# Patient Record
Sex: MALE | Race: WHITE
[De-identification: names, ages, dates, MRNs, and addresses within clinical notes are randomized per-mention and may not be internally consistent; named-entity substitution may affect disease eponyms.]

---

## 2018-10-12 NOTE — HP
CIWA Score





- CIWA Score


Nausea/Vomitin-No Nausea/No Vomiting


Muscle Tremors: 4-Moderate,w/Arms Extend


Anxiety: 4-Mod. Anxious/Guarded


Agitation: 4-Moderately Restless


Paroxysmal Sweats: 3 (Facial moisture w/o beading)


Orientation: 1-Uncertain about Date


Tacttile Disturbances: 0-None


Auditory Disturbances: 0-None


Visual Disturbances: 0-None


Headache: 0-None Present


CIWA-Ar Total Score: 16





Admission ROS [facility]





- HPI


Chief Complaint: 





Having withdrawal symptoms from alcohol.


Allergies/Adverse Reactions: 


 Allergies











Allergy/AdvReac Type Severity Reaction Status Date / Time


 


Fish Containing Products Allergy Severe  Verified 10/12/18 17:42


 


No Known Drug Allergies Allergy   Verified 10/12/18 17:47











History of Present Illness: 





I'm here for detox for my alcohol and cocaine use. 





Alcohol use since age 14.


Cocaine use since age 19.


Nicotine use since age 14.





Denies hx blackouts. States hx seizure 20 years ago r/t intense crack use.


Longest length of sobriety 7 years ended in . 





Hx: DM, HTN, hypercholesterol, Asthma. Chronic leg and back pain. States on 

oxycodone but doesn't take unless has really bad pain. 


Patient informed that this facility does not prescribe opiates for pain 

management and states ibuprofen will be okay.





State non-compliant w/ DM medications x 2 days. Current BG is 229. Will give 

Janumet and glipizide but hold insulin and evaluate need based on BGM during 

admission.  





Hx PPD (+). Patient brought in results of negative C-Xray from May, 2018. 





Search Terms: Lance Oconnor, 1975 


Search Date: 10/12/2018 04:46:37 PM 


The Drug Utilization Report below displays all of the controlled substance 

prescriptions, if any, that your patient has filled in the last twelve months. 

The information displayed on this report is compiled from pharmacy submissions 

to the Department, and accurately reflects the information as submitted by the 

pharmacies.


This report was requested by: Mira Baez | Reference #: [id number] 








Patient Name: Lance Oconnor YOB: 1975


 


Address: [address] [place] [address] Sex: Male














 Rx Written Rx Dispensed Drug Quantity Days Supply Prescriber Name  


 


10/09/2018 10/11/2018 oxycodone-acetaminophen  mg tab  30 15 Bonte, 

Jameson Reddy (MD)  


 


2018 oxycodone-acetaminophen  mg tab  30 15 Calvo, Sushma

  


 


2018 oxycodone-acetaminophen  mg tab  30 15 Jameson Rodriguez (MD)  


 


2018 oxycodone-acetaminophen  mg tab  30 15 Jameson Rodriguez (MD)  


 


2018 oxycodone-acetaminophen  mg tab  30 15 Calvo, Sushma

  


 


07/10/2018 07/10/2018 oxycodone-acetaminophen  mg tab  30 15 Calvo, Sushma

  


 


2018 oxycodone-acetaminophen  mg tab  30 15 Calvo, Sushma

  


 


2018 oxycodone-acetaminophen  mg tab  30 15 Jameson Rodriguez (MD)  


 


05/15/2018 05/15/2018 oxycodone-acetaminophen  mg tab  30 15 Jameson Rodriguez (MD)  














Patient Name: Lance Oconnor YOB: 1975


 


Address: [address] [place] [address] Sex: Male














 Rx Written Rx Dispensed Drug Quantity Days Supply Prescriber Name  


 


2018 oxycodone-acetaminophen  mg tab  21 7 Kitty Cook  


 


2018 oxycodone-acetaminophen  mg tab  21 7 Kitty Cook  


 


2018 oxycodone-acetaminophen  mg tab  21 7 Kitty Cook  


 


2018 oxycodone-acetaminophen  mg tab  21 7 Kitty Cook  


 


2018 acetaminophen-cod #3 tablet  30 15 Kitty Cook  


 


2017 oxycodone-acetaminophen  mg tab  60 30 Kasi Carranza MD  


 


11/15/2017 11/15/2017 oxycodone-acetaminophen  mg tab  60 30 Kasi Carranza MD  


 


10/18/2017 10/18/2017 oxycodone-acetaminophen  mg tab  60 30 Kasi Carranza MD  























x











Exam Limitations: No Limitations





- Ebola screening


Have you traveled outside of the country in the last 21 days: No


Have you had contact with anyone from an Ebola affected area: No


Have you been sick,other than usual withdrawal symptoms: No


Do you have a fever: No





- Review of Systems


Constitutional: Chills, Diaphoresis, Changes in sleep (Difficulty staying 

asleep.)


EENT: reports: Blurred Vision (Wears glasses), Dental Problems (Gingivitis. 

Chew and swallows ok.)


Respiratory: reports: Cough (Cough x 1 day. No phlegm), SOB with Exertion, 

Other (States has sleep apnea but not using a CPAP machine.)


Cardiac: reports: No Symptoms Reported


GI: reports: No Symptoms Reported


: reports: No Symptoms Reported


Musculoskeletal: reports: Back Pain (Chronic current LBP x 10 years. Pain is a '

9' and achy. Increases w/ walking. Decreases w/ rest.), Other (Pain in entire (L

) leg x 10 years. Pain '9' is achy. Worse w/ walking. Better w/ resting/sitting.

)


Integumentary: reports: No Symptoms Reported


Neuro: reports: Unsteady Gait (r/t pain in (L) leg)


Endocrine: reports: No Symptoms Reported


Hematology: reports: No Symptoms Reported


Psychiatric: reports: Judgement Intact, Agitated, Anxious, Depressed (Denies 

thoughts of harming self or others), other (Knows month and year. unsure of date

)


Other Systems: Reviewed and Negative





Patient History





- Patient Medical History


Hx Anemia: No


Hx Asthma: Yes (Moderate asthma. Occ flovent )


Hx Chronic Obstructive Pulmonary Disease (COPD): No


Hx Cancer: No


Hx Cardiac Disorders: No


Hx Congestive Heart Failure: No


Hx Hypertension: No


Hx Hypercholesterolemia: Yes (On meds )


Hx Pacemaker: No


HX Cerebrovascular Accident: No


Hx Seizures: Yes (20 years ago)


Hx Dementia: No


Hx Diabetes: Yes (States only takes Janumet, )


Hx Gastrointestinal Disorders: No


Hx Liver Disease: No


Hx Genitourinary Disorders: No


Hx Sexually Transmitted Disorders: No


Hx Renal Disease (ESRD): No


Hx Thyroid Disease: No


Hx Human Immunodeficiency Virus (HIV): No (Negative - Last tested )


Hx Hepatitis C: No


Hx Depression: Yes (Denies current thoughts of harming self)


Hx Suicide Attempt: Yes (20 years ago)


Hx Bipolar Disorder: Yes (bi-polar)


Hx Schizophrenia: No





- Patient Surgical History


Past Surgical History: Yes


Hx Orthopedic Surgery: Yes (ORIF )


Anesthesia Reaction: No





- PPD History


Previous Implant?: Yes


Implanted On Prior R Admission?: No


PPD to be Administered?: No





- Smoking Cessation


Smoking history: Current every day smoker


Have you smoked in the past 12 months: Yes


Aproximately how many cigarettes per day: 4


Hx Chewing Tobacco Use: No


Initiated information on smoking cessation: Yes


'Breaking Loose' booklet given: 10/12/18





- Substance & Tx. History


Hx Alcohol Use: Yes


Hx Substance Use: Yes


Substance Use Type: Alcohol, Cocaine


Hx Substance Use Treatment: Yes (detox)





- Substances Abused


  ** Alcohol


Route: Oral


Frequency: Daily


Amount used: 2 pints vodka, 12- 24 oz beers


Age of first use: 14


Date of Last Use: 10/12/18 (2 am)





  ** Cocaine


Route: Smoking


Frequency: Daily


Amount used: 2 gm


Age of first use: 19


Date of Last Use: 10/12/18 (2 am)





Admission Physical Exam [facility]





- Vital Signs


Vital Signs: 


 Vital Signs - 24 hr











  10/12/18





  14:14


 


Temperature 97.0 F L


 


Pulse Rate 81


 


Respiratory 20





Rate 


 


Blood Pressure 130/75














- Physical


General Appearance: Yes: Mild Distress, Tremorous, Irritable, Sweating, Anxious


HEENTM: Yes: EOMI, Hearing grossly Normal, Normal Voice, ALFREDO, Other (Enkarged 

tonsils w/o lesions or erythema. Stage 3.)


Respiratory: Yes: No Respiratory Distress, Wheezing (Inspiratory and expiratory 

wheeze. No rales or rhonchi)


Neck: Yes: No masses,lesions,Nodules, Supple


Breast: Yes: Breast Exam Deferred


Cardiology: Yes: Regular Rhythm, Regular Rate, S1, S2


Abdominal: Yes: Non Tender, Soft, Increased Bowel Sounds, Protuberent (

Increased abdomnial adiposity)


Genitourinary: Yes: Within Normal Limits


Back: Yes: Normal Inspection


Musculoskeletal: Yes: full range of Motion


Extremities: Yes: Normal Capillary Refill, Normal Range of Motion, Tremors (At 

rest and increases with arm extension), Other (Generalized LE adiposity. 

Increased darkened varicosities BLE. No edema.)


Neurological: Yes: CNs II-XII NML intact, Alert, Motor Strength 5/5, Normal 

Response


Integumentary: Yes: Normal Color, Warm, Other (Increased darkened varicosities 

BLE. No edema. Pedal pulses (+))


Lymphatic: Yes: Within Normal Limits





- Diagnostic


(1) Alcohol dependence with uncomplicated withdrawal


Current Visit: Yes   Status: Acute   





(2) Crack cocaine use


Current Visit: Yes   Status: Chronic   





(3) Diabetes


Current Visit: Yes   Status: Chronic   


Qualifiers: 


   Diabetes mellitus type: type 2   Diabetes mellitus long term insulin use: 

unspecified long term insulin use status   Diabetes mellitus complication status

: with unspecified complications   Qualified Code(s): E11.8 - Type 2 diabetes 

mellitus with unspecified complications   





(4) Hypertension


Current Visit: Yes   Status: Chronic   


Qualifiers: 


   Hypertension type: essential hypertension   Qualified Code(s): I10 - 

Essential (primary) hypertension   





(5) Arthralgia


Current Visit: Yes   Status: Chronic   


Qualifiers: 


   Joint pain location: unspecified   Qualified Code(s): M25.50 - Pain in 

unspecified joint   





(6) Morbid obesity


Current Visit: Yes   Status: Chronic   





(7) Varicose veins of both lower extremities


Current Visit: Yes   Status: Chronic   


Qualifiers: 


   Varicose vein complication: asymptomatic   Qualified Code(s): I83.93 - 

Asymptomatic varicose veins of bilateral lower extremities   





(8) Asthma


Current Visit: Yes   Status: Acute   


Qualifiers: 


   Asthma severity: unspecified severity   Asthma persistence: intermittent   

Asthma complication type: with acute exacerbation   Qualified Code(s): J45.21 - 

Mild intermittent asthma with (acute) exacerbation   





Cleared for Admission [facility]





- Detox or Rehab


[facility] Level of Care: Medically Managed


Detox Regimen/Protocol: Librium





[facility] Breath Alcohol Content


Breath Alcohol Content: 0





Urine Drug Screen





- Results


Drug Screen Negative: No


Urine Drug Screen Results: MEHUL-Cocaine, BZO-Benzodiazepines

## 2018-10-13 NOTE — CONSULT
[facility] Psychiatric Consult





- Data


Date of interview: 10/13/18


Admission source: [facility]


Identifying data: First admission to [facility] for this 41 y/o  male 

seeking detoxification treatment [address] for alcohol and cocaine dependence. 

Patient is single without dependents,homeless,unemployed and deprived of income.


Substance Abuse History: Discussed with patient in this interview. Mr Oconnor 

confirms the following data included in this segment of [facility] report : Smoking 

history: Current every day smoker.  Have you smoked in the past 12 months: Yes.

  Aproximately how many cigarettes per day: 4.  Hx Chewing Tobacco Use: No.  

Initiated information on smoking cessation: Yes.  'Breaking Loose' booklet given

: 10/12/18.  - Substance & Tx. History.  Hx Alcohol Use: Yes.  Hx Substance Use

: Yes.  Substance Use Type: Alcohol, Cocaine.  Hx Substance Use Treatment: Yes (

detox).  - Substances Abused.  ** Alcohol.  Route: Oral.  Frequency: Daily.  

Amount used: 2 pints vodka, 12- 24 oz beers.  Age of first use: 14.  Date of 

Last Use: 10/12/18 (2 am).  ** Cocaine.  Route: Smoking.  Frequency: Daily.  

Amount used: 2 gm.  Age of first use: 19.  Date of Last Use: 10/12/18 (2 am)


Medical History: Diabetes mellitus,dyslipidemia,obesity,bronchial asthma,

hypertension,history of positive PPD and chronic lumbar pain.


Psychiatric History: Patient endorses a history of five psychiatric 

hospitalizations ([facility]). Diagnosed with 

MDD and Bipolar Disorder (self-report).Mr Oconnor states that he used to be 

prescribed celexa,haldol,trazodone (doses and date of last intake : not recalled

).No contact with psychiatric OPD care providers. " I last took those 

medications when I was at [employer]. " More than six months ago. Patient 

admits to a remote history of a suicide attempt via overdose with medications (

years ago).


Physical/Sexual Abuse/Trauma History: Patient denies.


Additional Comment: Urine Drug Screen Results: MEHUL-Cocaine, BZO-

Benzodiazepines.Noted.





Mental Status Exam





- Mental Status Exam


Alert and Oriented to: Time, Place, Person


Cognitive Function: Grossly Intact


Patient Appearance: Unkempt, Disheveled


Mood: Nervous, Withdrawn


Affect: Mood Congruent


Patient Behavior: Fatigued, Cooperative


Speech Pattern: Clear


Voice Loudness: Normal


Thought Process: Goal Oriented


Thought Disorder: Not Present


Hallucinations: Denies


Suicidal Ideation: Denies


Homicidal Ideation: Denies


Insight/Judgement: Poor


Sleep: Poorly, Difficulty falling asleep


Appetite: Good


Muscle strength/Tone: Normal


Gait/Station: Normal





Psychiatric Findings





- Problem List (Axis 1, 2,3)


(1) Alcohol dependence with uncomplicated withdrawal


Current Visit: Yes   Status: Acute   





(2) Cocaine dependence


Current Visit: Yes   Status: Acute   





(3) Nicotine dependence


Current Visit: Yes   Status: Acute   





(4) Substance induced mood disorder


Current Visit: Yes   Status: Acute   





(5) Insomnia


Current Visit: Yes   Status: Acute   





(6) Non-compliant patient


Current Visit: Yes   Status: Chronic   





- Initial Treatment Plan


Initial Treatment Plan: Psychoeducation.Sleep hygiene.Detoxification in 

progress. No indication, at this time, for the re-introduction of psychotropic 

drugs other than medications needed for detoxification purposes.Insomnia is 

addressed with melatonin at bedside.Medication reconciliation : no 

psychotropics found ; only medical formulations. Observation.

## 2018-10-13 NOTE — EKG
Test Reason : 

Blood Pressure : ***/*** mmHG

Vent. Rate : 078 BPM     Atrial Rate : 078 BPM

   P-R Int : 166 ms          QRS Dur : 080 ms

    QT Int : 486 ms       P-R-T Axes : 060 037 056 degrees

   QTc Int : 554 ms

 

*** POOR DATA QUALITY, INTERPRETATION MAY BE ADVERSELY AFFECTED

NORMAL SINUS RHYTHM

PROLONGED QT

ABNORMAL ECG

NO PREVIOUS ECGS AVAILABLE

Confirmed by JEFFREY KNOWLES MD ([id number]) on 10/13/2018 10:34:13 AM

 

Referred By:             Confirmed By:JEFFREY KNOWLES MD

## 2018-10-13 NOTE — PN
S CIWA





- CIWA Score


Nausea/Vomitin


Muscle Tremors: 2


Anxiety: 2


Agitation: 2


Paroxysmal Sweats: 1-Minimal Palms Moist


Orientation: 0-Oriented


Tacttile Disturbances: 1-Very Mild Itch/Numbness


Auditory Disturbances: 1-Very Mild


Visual Disturbances: 1-Very Mild Sensitivity


Headache: 2-Mild


CIWA-Ar Total Score: 14





BHS Progress Note (SOAP)


Subjective: 





alert,irritable,anxious,interrupted sleep,tremor


Objective: 





10/13/18 16:13


 Vital Signs











Temperature  97.0 F L  10/13/18 15:23


 


Pulse Rate  74   10/13/18 15:23


 


Respiratory Rate  18   10/13/18 15:23


 


Blood Pressure  128/87   10/13/18 15:23


 


O2 Sat by Pulse Oximetry (%)      








ekg poor quality nsr


qt/qtc 486/554


no chest pain,no sob,no dizziness


 Laboratory Last Values











WBC  7.3 K/mm3 (4.0-10.0)   10/13/18  08:00    


 


RBC  4.68 M/mm3 (4.00-5.60)   10/13/18  08:00    


 


Hgb  13.8 GM/dL (11.7-16.9)   10/13/18  08:00    


 


Hct  41.8 % (35.4-49)   10/13/18  08:00    


 


MCV  89.2 fl (80-96)   10/13/18  08:00    


 


MCH  29.5 pg (25.7-33.7)   10/13/18  08:00    


 


MCHC  33.1 g/dl (32.0-35.9)   10/13/18  08:00    


 


RDW  14.6 % (11.9-15.9)   10/13/18  08:00    


 


Plt Count  190 K/MM3 (134-434)   10/13/18  08:00    


 


MPV  8.6 fl (7.5-11.1)   10/13/18  08:00    


 


Sodium  138 mmol/L (136-145)   10/13/18  08:00    


 


Potassium  4.0 mmol/L (3.5-5.1)   10/13/18  08:00    


 


Chloride  100 mmol/L ()   10/13/18  08:00    


 


Carbon Dioxide  30 mmol/L (21-32)   10/13/18  08:00    


 


Anion Gap  8 MMOL/L (8-16)   10/13/18  08:00    


 


BUN  15 mg/dL (7-18)   10/13/18  08:00    


 


Creatinine  0.6 mg/dL (0.55-1.3)   10/13/18  08:00    


 


Creat Clearance w eGFR  > 60  (>60)   10/13/18  08:00    


 


POC Glucometer  169 UNITS ()   10/13/18  07:14    


 


Random Glucose  157 mg/dL ()  H  10/13/18  08:00    


 


Calcium  8.9 mg/dL (8.5-10.1)   10/13/18  08:00    


 


Total Bilirubin  0.6 mg/dL (0.2-1)   10/13/18  08:00    


 


AST  13 U/L (15-37)  L  10/13/18  08:00    


 


ALT  13 U/L (13-61)   10/13/18  08:00    


 


Alkaline Phosphatase  78 U/L ()   10/13/18  08:00    


 


Total Protein  6.9 g/dl (6.4-8.2)   10/13/18  08:00    


 


Albumin  3.2 g/dl (3.4-5.0)  L  10/13/18  08:00    


 


Urine Color  Yellow   10/12/18  23:59    


 


Urine Appearance  Clear   10/12/18  23:59    


 


Urine pH  5.0  (5.0-8.0)   10/12/18  23:59    


 


Ur Specific Gravity  1.026  (1.010-1.035)   10/12/18  23:59    


 


Urine Protein  1+  (NEGATIVE)  H  10/12/18  23:59    


 


Urine Glucose (UA)  3+  (NEGATIVE)  H  10/12/18  23:59    


 


Urine Ketones  Trace  (NEGATIVE)  H  10/12/18  23:59    


 


Urine Blood  Negative  (NEGATIVE)   10/12/18  23:59    


 


Urine Nitrite  Negative  (NEGATIVE)   10/12/18  23:59    


 


Urine Bilirubin  Negative  (<2.0 mg/dL)   10/12/18  23:59    


 


Urine Urobilinogen  Negative mg/dL (0.2-1.0)   10/12/18  23:59    


 


Ur Leukocyte Esterase  Negative  (NEGATIVE)   10/12/18  23:59    


 


Urine WBC (Auto)  <1 /hpf (3-5)   10/12/18  23:59    


 


Urine RBC (Auto)  <1 /hpf (0-3)   10/12/18  23:59    


 


Ur Epithelial Cells  Rare /HPF (FEW)   10/12/18  23:59    


 


Hyaline Casts  3 /lpf  10/12/18  23:59    


 


Urine Mucus  Moderate   10/12/18  23:59    


 


RPR Titer  Nonreactive  (NONREACTIVE)   10/13/18  08:00    











Assessment: 





10/13/18 16:15


withdrawal symptom


Plan: 





continue detox,bgm monitoring

## 2018-10-14 NOTE — PN
[facility] CIWA





- CIWA Score


Nausea/Vomitin


Muscle Tremors: 4-Moderate,w/Arms Extend


Anxiety: 4-Mod. Anxious/Guarded


Agitation: 4-Moderately Restless


Paroxysmal Sweats: 3


Orientation: 0-Oriented


Tacttile Disturbances: 0-None


Auditory Disturbances: 0-None


Visual Disturbances: 0-None


Headache: 0-None Present


CIWA-Ar Total Score: 17





[facility] Progress Note (SOAP)


Subjective: 





Tremor, chills, sweating, interrupted sleep


Objective: 





10/14/18 16:29


 Last Vital Signs











Temp Pulse Resp BP Pulse Ox


 


 98.0 F   75   20   131/84    


 


 10/14/18 14:14  10/14/18 14:14  10/14/18 14:14  10/14/18 14:14   








 Laboratory Tests











  10/12/18 10/12/18 10/13/18





  17:53 23:59 07:14


 


WBC   


 


RBC   


 


Hgb   


 


Hct   


 


MCV   


 


MCH   


 


MCHC   


 


RDW   


 


Plt Count   


 


MPV   


 


Sodium   


 


Potassium   


 


Chloride   


 


Carbon Dioxide   


 


Anion Gap   


 


BUN   


 


Creatinine   


 


Creat Clearance w eGFR   


 


POC Glucometer  229   169


 


Random Glucose   


 


Calcium   


 


Total Bilirubin   


 


AST   


 


ALT   


 


Alkaline Phosphatase   


 


Total Protein   


 


Albumin   


 


Urine Color   Yellow 


 


Urine Appearance   Clear 


 


Urine pH   5.0 


 


Ur Specific Gravity   1.026 


 


Urine Protein   1+ H 


 


Urine Glucose (UA)   3+ H 


 


Urine Ketones   Trace H 


 


Urine Blood   Negative 


 


Urine Nitrite   Negative 


 


Urine Bilirubin   Negative 


 


Urine Urobilinogen   Negative 


 


Ur Leukocyte Esterase   Negative 


 


Urine WBC (Auto)   <1 


 


Urine RBC (Auto)   <1 


 


Ur Epithelial Cells   Rare 


 


Hyaline Casts   3 


 


Urine Mucus   Moderate 


 


RPR Titer   














  10/13/18 10/13/18 10/13/18





  08:00 08:00 08:00


 


WBC  7.3  


 


RBC  4.68  


 


Hgb  13.8  


 


Hct  41.8  


 


MCV  89.2  


 


MCH  29.5  


 


MCHC  33.1  


 


RDW  14.6  


 


Plt Count  190  


 


MPV  8.6  


 


Sodium   138 


 


Potassium   4.0 


 


Chloride   100 


 


Carbon Dioxide   30 


 


Anion Gap   8 


 


BUN   15 


 


Creatinine   0.6 


 


Creat Clearance w eGFR   > 60 


 


POC Glucometer   


 


Random Glucose   157 H 


 


Calcium   8.9 


 


Total Bilirubin   0.6 


 


AST   13 L 


 


ALT   13 


 


Alkaline Phosphatase   78 


 


Total Protein   6.9 


 


Albumin   3.2 L 


 


Urine Color   


 


Urine Appearance   


 


Urine pH   


 


Ur Specific Gravity   


 


Urine Protein   


 


Urine Glucose (UA)   


 


Urine Ketones   


 


Urine Blood   


 


Urine Nitrite   


 


Urine Bilirubin   


 


Urine Urobilinogen   


 


Ur Leukocyte Esterase   


 


Urine WBC (Auto)   


 


Urine RBC (Auto)   


 


Ur Epithelial Cells   


 


Hyaline Casts   


 


Urine Mucus   


 


RPR Titer    Nonreactive














  10/13/18 10/13/18 10/14/18





  16:51 20:24 06:30


 


WBC   


 


RBC   


 


Hgb   


 


Hct   


 


MCV   


 


MCH   


 


MCHC   


 


RDW   


 


Plt Count   


 


MPV   


 


Sodium   


 


Potassium   


 


Chloride   


 


Carbon Dioxide   


 


Anion Gap   


 


BUN   


 


Creatinine   


 


Creat Clearance w eGFR   


 


POC Glucometer  198  186  160


 


Random Glucose   


 


Calcium   


 


Total Bilirubin   


 


AST   


 


ALT   


 


Alkaline Phosphatase   


 


Total Protein   


 


Albumin   


 


Urine Color   


 


Urine Appearance   


 


Urine pH   


 


Ur Specific Gravity   


 


Urine Protein   


 


Urine Glucose (UA)   


 


Urine Ketones   


 


Urine Blood   


 


Urine Nitrite   


 


Urine Bilirubin   


 


Urine Urobilinogen   


 


Ur Leukocyte Esterase   


 


Urine WBC (Auto)   


 


Urine RBC (Auto)   


 


Ur Epithelial Cells   


 


Hyaline Casts   


 


Urine Mucus   


 


RPR Titer   








Labs reviewed: increased glucose, abnormal UA


Assessment: 





10/14/18 16:30


Withdrawal sxs





Noted with hyperglycemia and abnormal UA


Plan: 





Continue detox





Hyperglycemia: secondary to DMT2, continue present regimen





Abnormal UA: encouraged PO water intake, repeat UA

## 2018-10-15 NOTE — PN
[facility] Progress Note (SOAP)


Subjective: 





Sweating, interrupted sleep


Objective: 





10/15/18 12:38


 Last Vital Signs











Temp Pulse Resp BP Pulse Ox


 


 96.7 F L  57 L  18   120/77    


 


 10/15/18 09:25  10/15/18 09:25  10/15/18 09:25  10/15/18 09:25   








 Laboratory Tests











  10/12/18 10/12/18 10/13/18





  17:53 23:59 07:14


 


WBC   


 


RBC   


 


Hgb   


 


Hct   


 


MCV   


 


MCH   


 


MCHC   


 


RDW   


 


Plt Count   


 


MPV   


 


Sodium   


 


Potassium   


 


Chloride   


 


Carbon Dioxide   


 


Anion Gap   


 


BUN   


 


Creatinine   


 


Creat Clearance w eGFR   


 


POC Glucometer  229   169


 


Random Glucose   


 


Calcium   


 


Total Bilirubin   


 


AST   


 


ALT   


 


Alkaline Phosphatase   


 


Total Protein   


 


Albumin   


 


Urine Color   Yellow 


 


Urine Appearance   Clear 


 


Urine pH   5.0 


 


Ur Specific Gravity   1.026 


 


Urine Protein   1+ H 


 


Urine Glucose (UA)   3+ H 


 


Urine Ketones   Trace H 


 


Urine Blood   Negative 


 


Urine Nitrite   Negative 


 


Urine Bilirubin   Negative 


 


Urine Urobilinogen   Negative 


 


Ur Leukocyte Esterase   Negative 


 


Urine WBC (Auto)   <1 


 


Urine RBC (Auto)   <1 


 


Ur Epithelial Cells   Rare 


 


Hyaline Casts   3 


 


Urine Mucus   Moderate 


 


RPR Titer   














  10/13/18 10/13/18 10/13/18





  08:00 08:00 08:00


 


WBC  7.3  


 


RBC  4.68  


 


Hgb  13.8  


 


Hct  41.8  


 


MCV  89.2  


 


MCH  29.5  


 


MCHC  33.1  


 


RDW  14.6  


 


Plt Count  190  


 


MPV  8.6  


 


Sodium   138 


 


Potassium   4.0 


 


Chloride   100 


 


Carbon Dioxide   30 


 


Anion Gap   8 


 


BUN   15 


 


Creatinine   0.6 


 


Creat Clearance w eGFR   > 60 


 


POC Glucometer   


 


Random Glucose   157 H 


 


Calcium   8.9 


 


Total Bilirubin   0.6 


 


AST   13 L 


 


ALT   13 


 


Alkaline Phosphatase   78 


 


Total Protein   6.9 


 


Albumin   3.2 L 


 


Urine Color   


 


Urine Appearance   


 


Urine pH   


 


Ur Specific Gravity   


 


Urine Protein   


 


Urine Glucose (UA)   


 


Urine Ketones   


 


Urine Blood   


 


Urine Nitrite   


 


Urine Bilirubin   


 


Urine Urobilinogen   


 


Ur Leukocyte Esterase   


 


Urine WBC (Auto)   


 


Urine RBC (Auto)   


 


Ur Epithelial Cells   


 


Hyaline Casts   


 


Urine Mucus   


 


RPR Titer    Nonreactive














  10/13/18 10/13/18 10/14/18





  16:51 20:24 06:30


 


WBC   


 


RBC   


 


Hgb   


 


Hct   


 


MCV   


 


MCH   


 


MCHC   


 


RDW   


 


Plt Count   


 


MPV   


 


Sodium   


 


Potassium   


 


Chloride   


 


Carbon Dioxide   


 


Anion Gap   


 


BUN   


 


Creatinine   


 


Creat Clearance w eGFR   


 


POC Glucometer  198  186  160


 


Random Glucose   


 


Calcium   


 


Total Bilirubin   


 


AST   


 


ALT   


 


Alkaline Phosphatase   


 


Total Protein   


 


Albumin   


 


Urine Color   


 


Urine Appearance   


 


Urine pH   


 


Ur Specific Gravity   


 


Urine Protein   


 


Urine Glucose (UA)   


 


Urine Ketones   


 


Urine Blood   


 


Urine Nitrite   


 


Urine Bilirubin   


 


Urine Urobilinogen   


 


Ur Leukocyte Esterase   


 


Urine WBC (Auto)   


 


Urine RBC (Auto)   


 


Ur Epithelial Cells   


 


Hyaline Casts   


 


Urine Mucus   


 


RPR Titer   














  10/14/18 10/15/18





  16:17 05:45


 


WBC  


 


RBC  


 


Hgb  


 


Hct  


 


MCV  


 


MCH  


 


MCHC  


 


RDW  


 


Plt Count  


 


MPV  


 


Sodium  


 


Potassium  


 


Chloride  


 


Carbon Dioxide  


 


Anion Gap  


 


BUN  


 


Creatinine  


 


Creat Clearance w eGFR  


 


POC Glucometer  168  162


 


Random Glucose  


 


Calcium  


 


Total Bilirubin  


 


AST  


 


ALT  


 


Alkaline Phosphatase  


 


Total Protein  


 


Albumin  


 


Urine Color  


 


Urine Appearance  


 


Urine pH  


 


Ur Specific Gravity  


 


Urine Protein  


 


Urine Glucose (UA)  


 


Urine Ketones  


 


Urine Blood  


 


Urine Nitrite  


 


Urine Bilirubin  


 


Urine Urobilinogen  


 


Ur Leukocyte Esterase  


 


Urine WBC (Auto)  


 


Urine RBC (Auto)  


 


Ur Epithelial Cells  


 


Hyaline Casts  


 


Urine Mucus  


 


RPR Titer  








Labs reviewed


Assessment: 





10/15/18 12:39


Withdrawal sxs





Noted with abnormal UA


Plan: 





Continue detox





Abnormal UA: encouraged PO water intake, follow up on repeated UA result

## 2021-02-11 ENCOUNTER — HOSPITAL ENCOUNTER (INPATIENT)
Dept: HOSPITAL 74 - YASAS | Age: 46
LOS: 14 days | Discharge: HOME | DRG: 772 | End: 2021-02-25
Attending: ALLERGY & IMMUNOLOGY | Admitting: ALLERGY & IMMUNOLOGY
Payer: COMMERCIAL

## 2021-02-11 VITALS — BODY MASS INDEX: 44.1 KG/M2

## 2021-02-11 DIAGNOSIS — E10.9: ICD-10-CM

## 2021-02-11 DIAGNOSIS — F17.213: ICD-10-CM

## 2021-02-11 DIAGNOSIS — F10.20: Primary | ICD-10-CM

## 2021-02-11 DIAGNOSIS — Z91.013: ICD-10-CM

## 2021-02-11 DIAGNOSIS — F14.20: ICD-10-CM

## 2021-02-11 DIAGNOSIS — M54.5: ICD-10-CM

## 2021-02-11 DIAGNOSIS — Z79.4: ICD-10-CM

## 2021-02-11 DIAGNOSIS — E66.01: ICD-10-CM

## 2021-02-11 DIAGNOSIS — F19.24: ICD-10-CM

## 2021-02-11 DIAGNOSIS — J45.909: ICD-10-CM

## 2021-02-11 DIAGNOSIS — F41.9: ICD-10-CM

## 2021-02-11 DIAGNOSIS — I10: ICD-10-CM

## 2021-02-11 PROCEDURE — HZ42ZZZ GROUP COUNSELING FOR SUBSTANCE ABUSE TREATMENT, COGNITIVE-BEHAVIORAL: ICD-10-PCS | Performed by: ALLERGY & IMMUNOLOGY

## 2021-02-11 PROCEDURE — U0003 INFECTIOUS AGENT DETECTION BY NUCLEIC ACID (DNA OR RNA); SEVERE ACUTE RESPIRATORY SYNDROME CORONAVIRUS 2 (SARS-COV-2) (CORONAVIRUS DISEASE [COVID-19]), AMPLIFIED PROBE TECHNIQUE, MAKING USE OF HIGH THROUGHPUT TECHNOLOGIES AS DESCRIBED BY CMS-2020-01-R: HCPCS

## 2021-02-11 PROCEDURE — C9803 HOPD COVID-19 SPEC COLLECT: HCPCS

## 2021-02-11 RX ADMIN — Medication SCH MG: at 21:07

## 2021-02-11 RX ADMIN — Medication SCH TAB: at 18:37

## 2021-02-11 RX ADMIN — NICOTINE SCH MG: 7 PATCH TRANSDERMAL at 18:37

## 2021-02-11 RX ADMIN — Medication SCH MG: at 21:08

## 2021-02-12 LAB
APPEARANCE UR: CLEAR
BACTERIA # UR AUTO: 256 /UL (ref 0–1359)
BILIRUB UR STRIP.AUTO-MCNC: NEGATIVE MG/DL
CASTS URNS QL MICRO: 0 /UL (ref 0–3.1)
COLOR UR: YELLOW
EPITH CASTS URNS QL MICRO: 3 /UL (ref 0–25.1)
KETONES UR QL STRIP: NEGATIVE
LEUKOCYTE ESTERASE UR QL STRIP.AUTO: NEGATIVE
NITRITE UR QL STRIP: NEGATIVE
PH UR: 5 [PH] (ref 5–8)
PROT UR QL STRIP: (no result)
PROT UR QL STRIP: NEGATIVE
RBC # BLD AUTO: 2 /UL (ref 0–23.9)
SP GR UR: 1.02 (ref 1.01–1.03)
UROBILINOGEN UR STRIP-MCNC: 0.2 MG/DL (ref 0.2–1)
WBC # UR AUTO: 1 /UL (ref 0–25.8)

## 2021-02-12 RX ADMIN — NICOTINE SCH MG: 7 PATCH TRANSDERMAL at 10:37

## 2021-02-12 RX ADMIN — Medication SCH: at 22:02

## 2021-02-12 RX ADMIN — Medication SCH TAB: at 10:37

## 2021-02-12 RX ADMIN — METFORMIN HYDROCHLORIDE SCH MG: 500 TABLET ORAL at 06:46

## 2021-02-12 RX ADMIN — LISINOPRIL SCH MG: 5 TABLET ORAL at 10:37

## 2021-02-12 RX ADMIN — INSULIN DETEMIR SCH UNITS: 100 INJECTION, SOLUTION SUBCUTANEOUS at 06:49

## 2021-02-12 RX ADMIN — GLIPIZIDE SCH MG: 10 TABLET ORAL at 06:46

## 2021-02-12 RX ADMIN — ASPIRIN 81 MG SCH MG: 81 TABLET ORAL at 10:37

## 2021-02-13 LAB
ALBUMIN SERPL-MCNC: 3.6 G/DL (ref 3.4–5)
ALP SERPL-CCNC: 82 U/L (ref 45–117)
ALT SERPL-CCNC: 20 U/L (ref 13–61)
ANION GAP SERPL CALC-SCNC: 5 MMOL/L (ref 8–16)
AST SERPL-CCNC: 22 U/L (ref 15–37)
BILIRUB SERPL-MCNC: 0.6 MG/DL (ref 0.2–1)
BUN SERPL-MCNC: 19.9 MG/DL (ref 7–18)
CALCIUM SERPL-MCNC: 9.1 MG/DL (ref 8.5–10.1)
CHLORIDE SERPL-SCNC: 99 MMOL/L (ref 98–107)
CO2 SERPL-SCNC: 31 MMOL/L (ref 21–32)
CREAT SERPL-MCNC: 0.8 MG/DL (ref 0.55–1.3)
DEPRECATED RDW RBC AUTO: 13.6 % (ref 11.9–15.9)
GLUCOSE SERPL-MCNC: 161 MG/DL (ref 74–106)
HCT VFR BLD CALC: 42.3 % (ref 35.4–49)
HGB BLD-MCNC: 14.2 GM/DL (ref 11.7–16.9)
MCH RBC QN AUTO: 30.8 PG (ref 25.7–33.7)
MCHC RBC AUTO-ENTMCNC: 33.6 G/DL (ref 32–35.9)
MCV RBC: 91.9 FL (ref 80–96)
PLATELET # BLD AUTO: 199 K/MM3 (ref 134–434)
PMV BLD: 8.4 FL (ref 7.5–11.1)
POTASSIUM SERPLBLD-SCNC: 4.3 MMOL/L (ref 3.5–5.1)
PROT SERPL-MCNC: 7.8 G/DL (ref 6.4–8.2)
RBC # BLD AUTO: 4.61 M/MM3 (ref 4–5.6)
SODIUM SERPL-SCNC: 135 MMOL/L (ref 136–145)
WBC # BLD AUTO: 6.9 K/MM3 (ref 4–10)

## 2021-02-13 RX ADMIN — LISINOPRIL SCH MG: 5 TABLET ORAL at 10:10

## 2021-02-13 RX ADMIN — ASPIRIN 81 MG SCH MG: 81 TABLET ORAL at 10:10

## 2021-02-13 RX ADMIN — INSULIN DETEMIR SCH UNITS: 100 INJECTION, SOLUTION SUBCUTANEOUS at 06:39

## 2021-02-13 RX ADMIN — GLIPIZIDE SCH MG: 10 TABLET ORAL at 06:40

## 2021-02-13 RX ADMIN — Medication SCH MG: at 21:09

## 2021-02-13 RX ADMIN — Medication SCH TAB: at 10:10

## 2021-02-13 RX ADMIN — METFORMIN HYDROCHLORIDE SCH MG: 500 TABLET ORAL at 06:40

## 2021-02-13 RX ADMIN — NICOTINE SCH MG: 7 PATCH TRANSDERMAL at 10:10

## 2021-02-14 RX ADMIN — Medication SCH MG: at 21:53

## 2021-02-14 RX ADMIN — METFORMIN HYDROCHLORIDE SCH MG: 500 TABLET ORAL at 06:11

## 2021-02-14 RX ADMIN — GLIPIZIDE SCH MG: 10 TABLET ORAL at 06:11

## 2021-02-14 RX ADMIN — NICOTINE SCH: 7 PATCH TRANSDERMAL at 09:56

## 2021-02-14 RX ADMIN — Medication SCH TAB: at 09:55

## 2021-02-14 RX ADMIN — INSULIN DETEMIR SCH UNITS: 100 INJECTION, SOLUTION SUBCUTANEOUS at 07:41

## 2021-02-14 RX ADMIN — LISINOPRIL SCH MG: 5 TABLET ORAL at 09:55

## 2021-02-14 RX ADMIN — ASPIRIN 81 MG SCH MG: 81 TABLET ORAL at 09:55

## 2021-02-15 RX ADMIN — Medication SCH MG: at 21:34

## 2021-02-15 RX ADMIN — METFORMIN HYDROCHLORIDE SCH MG: 500 TABLET ORAL at 06:18

## 2021-02-15 RX ADMIN — LISINOPRIL SCH MG: 5 TABLET ORAL at 10:44

## 2021-02-15 RX ADMIN — ACETAMINOPHEN PRN MG: 325 TABLET ORAL at 10:45

## 2021-02-15 RX ADMIN — GLIPIZIDE SCH MG: 10 TABLET ORAL at 06:18

## 2021-02-15 RX ADMIN — Medication SCH TAB: at 10:44

## 2021-02-15 RX ADMIN — NAPROXEN SCH MG: 375 TABLET ORAL at 21:33

## 2021-02-15 RX ADMIN — NICOTINE SCH: 7 PATCH TRANSDERMAL at 10:44

## 2021-02-15 RX ADMIN — ASPIRIN 81 MG SCH MG: 81 TABLET ORAL at 10:44

## 2021-02-15 RX ADMIN — INSULIN DETEMIR SCH UNITS: 100 INJECTION, SOLUTION SUBCUTANEOUS at 06:19

## 2021-02-16 RX ADMIN — INSULIN DETEMIR SCH UNITS: 100 INJECTION, SOLUTION SUBCUTANEOUS at 06:18

## 2021-02-16 RX ADMIN — NAPROXEN SCH MG: 375 TABLET ORAL at 21:29

## 2021-02-16 RX ADMIN — LISINOPRIL SCH MG: 5 TABLET ORAL at 10:11

## 2021-02-16 RX ADMIN — Medication SCH MG: at 21:29

## 2021-02-16 RX ADMIN — GLIPIZIDE SCH MG: 10 TABLET ORAL at 06:16

## 2021-02-16 RX ADMIN — NICOTINE SCH: 7 PATCH TRANSDERMAL at 10:13

## 2021-02-16 RX ADMIN — ASPIRIN 81 MG SCH MG: 81 TABLET ORAL at 10:11

## 2021-02-16 RX ADMIN — METFORMIN HYDROCHLORIDE SCH MG: 500 TABLET ORAL at 06:16

## 2021-02-16 RX ADMIN — Medication SCH TAB: at 10:11

## 2021-02-16 RX ADMIN — NAPROXEN SCH MG: 375 TABLET ORAL at 10:11

## 2021-02-17 RX ADMIN — ASPIRIN 81 MG SCH MG: 81 TABLET ORAL at 09:31

## 2021-02-17 RX ADMIN — Medication SCH: at 23:06

## 2021-02-17 RX ADMIN — NAPROXEN SCH MG: 375 TABLET ORAL at 09:31

## 2021-02-17 RX ADMIN — INSULIN DETEMIR SCH UNITS: 100 INJECTION, SOLUTION SUBCUTANEOUS at 06:08

## 2021-02-17 RX ADMIN — NICOTINE SCH: 7 PATCH TRANSDERMAL at 09:32

## 2021-02-17 RX ADMIN — NAPROXEN SCH: 375 TABLET ORAL at 23:06

## 2021-02-17 RX ADMIN — LISINOPRIL SCH MG: 5 TABLET ORAL at 09:32

## 2021-02-17 RX ADMIN — Medication SCH TAB: at 09:31

## 2021-02-17 RX ADMIN — GLIPIZIDE SCH MG: 10 TABLET ORAL at 06:08

## 2021-02-17 RX ADMIN — Medication SCH: at 23:07

## 2021-02-17 RX ADMIN — METFORMIN HYDROCHLORIDE SCH MG: 500 TABLET ORAL at 06:07

## 2021-02-18 RX ADMIN — NAPROXEN SCH MG: 375 TABLET ORAL at 21:23

## 2021-02-18 RX ADMIN — ACETAMINOPHEN PRN MG: 325 TABLET ORAL at 20:00

## 2021-02-18 RX ADMIN — Medication SCH MG: at 21:24

## 2021-02-18 RX ADMIN — INSULIN DETEMIR SCH UNITS: 100 INJECTION, SOLUTION SUBCUTANEOUS at 06:09

## 2021-02-18 RX ADMIN — Medication SCH TAB: at 10:02

## 2021-02-18 RX ADMIN — NICOTINE SCH: 7 PATCH TRANSDERMAL at 10:03

## 2021-02-18 RX ADMIN — NAPROXEN SCH MG: 375 TABLET ORAL at 10:02

## 2021-02-18 RX ADMIN — LISINOPRIL SCH MG: 5 TABLET ORAL at 10:02

## 2021-02-18 RX ADMIN — GLIPIZIDE SCH MG: 10 TABLET ORAL at 06:09

## 2021-02-18 RX ADMIN — METFORMIN HYDROCHLORIDE SCH MG: 500 TABLET ORAL at 06:09

## 2021-02-18 RX ADMIN — ASPIRIN 81 MG SCH MG: 81 TABLET ORAL at 10:03

## 2021-02-19 RX ADMIN — Medication SCH MG: at 21:07

## 2021-02-19 RX ADMIN — NAPROXEN SCH: 375 TABLET ORAL at 09:44

## 2021-02-19 RX ADMIN — NICOTINE SCH: 7 PATCH TRANSDERMAL at 09:44

## 2021-02-19 RX ADMIN — METFORMIN HYDROCHLORIDE SCH MG: 500 TABLET ORAL at 06:44

## 2021-02-19 RX ADMIN — GLIPIZIDE SCH MG: 10 TABLET ORAL at 06:44

## 2021-02-19 RX ADMIN — INSULIN DETEMIR SCH UNITS: 100 INJECTION, SOLUTION SUBCUTANEOUS at 07:00

## 2021-02-19 RX ADMIN — ASPIRIN 81 MG SCH MG: 81 TABLET ORAL at 09:44

## 2021-02-19 RX ADMIN — Medication SCH TAB: at 09:43

## 2021-02-19 RX ADMIN — NAPROXEN SCH MG: 375 TABLET ORAL at 21:08

## 2021-02-19 RX ADMIN — LISINOPRIL SCH MG: 5 TABLET ORAL at 09:44

## 2021-02-20 RX ADMIN — METFORMIN HYDROCHLORIDE SCH MG: 500 TABLET ORAL at 06:03

## 2021-02-20 RX ADMIN — LISINOPRIL SCH MG: 5 TABLET ORAL at 09:46

## 2021-02-20 RX ADMIN — INSULIN DETEMIR SCH UNITS: 100 INJECTION, SOLUTION SUBCUTANEOUS at 06:05

## 2021-02-20 RX ADMIN — NAPROXEN SCH: 375 TABLET ORAL at 09:47

## 2021-02-20 RX ADMIN — Medication SCH: at 21:37

## 2021-02-20 RX ADMIN — NICOTINE SCH: 7 PATCH TRANSDERMAL at 09:52

## 2021-02-20 RX ADMIN — ASPIRIN 81 MG SCH MG: 81 TABLET ORAL at 09:46

## 2021-02-20 RX ADMIN — NAPROXEN SCH: 375 TABLET ORAL at 21:38

## 2021-02-20 RX ADMIN — GLIPIZIDE SCH MG: 10 TABLET ORAL at 06:02

## 2021-02-20 RX ADMIN — INSULIN DETEMIR SCH: 100 INJECTION, SOLUTION SUBCUTANEOUS at 00:15

## 2021-02-20 RX ADMIN — Medication SCH TAB: at 09:46

## 2021-02-20 RX ADMIN — Medication SCH: at 21:38

## 2021-02-21 RX ADMIN — LISINOPRIL SCH MG: 5 TABLET ORAL at 09:45

## 2021-02-21 RX ADMIN — ASPIRIN 81 MG SCH MG: 81 TABLET ORAL at 09:45

## 2021-02-21 RX ADMIN — NAPROXEN SCH MG: 375 TABLET ORAL at 09:45

## 2021-02-21 RX ADMIN — Medication SCH TAB: at 09:45

## 2021-02-21 RX ADMIN — INSULIN DETEMIR SCH UNITS: 100 INJECTION, SOLUTION SUBCUTANEOUS at 06:13

## 2021-02-21 RX ADMIN — NICOTINE SCH: 7 PATCH TRANSDERMAL at 09:46

## 2021-02-21 RX ADMIN — METFORMIN HYDROCHLORIDE SCH MG: 500 TABLET ORAL at 06:12

## 2021-02-21 RX ADMIN — Medication SCH MG: at 21:41

## 2021-02-21 RX ADMIN — Medication SCH MG: at 21:40

## 2021-02-21 RX ADMIN — NAPROXEN SCH MG: 375 TABLET ORAL at 21:40

## 2021-02-21 RX ADMIN — GLIPIZIDE SCH MG: 10 TABLET ORAL at 06:12

## 2021-02-22 RX ADMIN — Medication SCH: at 22:24

## 2021-02-22 RX ADMIN — NICOTINE SCH: 7 PATCH TRANSDERMAL at 09:46

## 2021-02-22 RX ADMIN — NAPROXEN SCH MG: 375 TABLET ORAL at 09:45

## 2021-02-22 RX ADMIN — GLIPIZIDE SCH MG: 10 TABLET ORAL at 06:19

## 2021-02-22 RX ADMIN — ASPIRIN 81 MG SCH MG: 81 TABLET ORAL at 09:45

## 2021-02-22 RX ADMIN — Medication SCH TAB: at 09:45

## 2021-02-22 RX ADMIN — INSULIN DETEMIR SCH UNITS: 100 INJECTION, SOLUTION SUBCUTANEOUS at 06:19

## 2021-02-22 RX ADMIN — METFORMIN HYDROCHLORIDE SCH MG: 500 TABLET ORAL at 06:19

## 2021-02-22 RX ADMIN — LISINOPRIL SCH MG: 5 TABLET ORAL at 09:45

## 2021-02-22 RX ADMIN — NAPROXEN SCH: 375 TABLET ORAL at 22:24

## 2021-02-23 RX ADMIN — Medication SCH TAB: at 10:05

## 2021-02-23 RX ADMIN — Medication SCH MG: at 21:09

## 2021-02-23 RX ADMIN — METFORMIN HYDROCHLORIDE SCH MG: 500 TABLET ORAL at 06:09

## 2021-02-23 RX ADMIN — INSULIN DETEMIR SCH UNITS: 100 INJECTION, SOLUTION SUBCUTANEOUS at 06:13

## 2021-02-23 RX ADMIN — GLIPIZIDE SCH MG: 10 TABLET ORAL at 06:10

## 2021-02-23 RX ADMIN — NICOTINE SCH: 7 PATCH TRANSDERMAL at 10:05

## 2021-02-23 RX ADMIN — NAPROXEN SCH: 375 TABLET ORAL at 11:05

## 2021-02-23 RX ADMIN — LISINOPRIL SCH MG: 5 TABLET ORAL at 10:05

## 2021-02-23 RX ADMIN — NAPROXEN SCH MG: 375 TABLET ORAL at 21:09

## 2021-02-23 RX ADMIN — ASPIRIN 81 MG SCH MG: 81 TABLET ORAL at 10:05

## 2021-02-24 RX ADMIN — Medication SCH MG: at 21:34

## 2021-02-24 RX ADMIN — METFORMIN HYDROCHLORIDE SCH MG: 500 TABLET ORAL at 06:10

## 2021-02-24 RX ADMIN — NICOTINE SCH: 7 PATCH TRANSDERMAL at 09:45

## 2021-02-24 RX ADMIN — Medication SCH TAB: at 09:43

## 2021-02-24 RX ADMIN — LISINOPRIL SCH MG: 5 TABLET ORAL at 09:43

## 2021-02-24 RX ADMIN — ASPIRIN 81 MG SCH MG: 81 TABLET ORAL at 09:43

## 2021-02-24 RX ADMIN — NAPROXEN SCH MG: 375 TABLET ORAL at 09:43

## 2021-02-24 RX ADMIN — NAPROXEN SCH MG: 375 TABLET ORAL at 21:35

## 2021-02-24 RX ADMIN — GLIPIZIDE SCH MG: 10 TABLET ORAL at 06:10

## 2021-02-24 RX ADMIN — INSULIN DETEMIR SCH UNITS: 100 INJECTION, SOLUTION SUBCUTANEOUS at 06:10

## 2021-02-25 VITALS — TEMPERATURE: 98.1 F | SYSTOLIC BLOOD PRESSURE: 149 MMHG | DIASTOLIC BLOOD PRESSURE: 82 MMHG | HEART RATE: 62 BPM

## 2021-02-25 RX ADMIN — INSULIN DETEMIR SCH UNITS: 100 INJECTION, SOLUTION SUBCUTANEOUS at 06:02

## 2021-02-25 RX ADMIN — LISINOPRIL SCH MG: 5 TABLET ORAL at 09:18

## 2021-02-25 RX ADMIN — Medication SCH TAB: at 09:19

## 2021-02-25 RX ADMIN — NAPROXEN SCH MG: 375 TABLET ORAL at 09:18

## 2021-02-25 RX ADMIN — METFORMIN HYDROCHLORIDE SCH MG: 500 TABLET ORAL at 06:00

## 2021-02-25 RX ADMIN — ASPIRIN 81 MG SCH MG: 81 TABLET ORAL at 09:18

## 2021-02-25 RX ADMIN — NICOTINE SCH: 7 PATCH TRANSDERMAL at 09:19

## 2021-02-25 RX ADMIN — GLIPIZIDE SCH MG: 10 TABLET ORAL at 06:00

## 2022-08-31 ENCOUNTER — HOSPITAL ENCOUNTER (INPATIENT)
Dept: HOSPITAL 74 - YASAS | Age: 47
LOS: 5 days | Discharge: OTHER INSTITUTION | End: 2022-09-05
Attending: SURGERY | Admitting: ALLERGY & IMMUNOLOGY
Payer: COMMERCIAL

## 2022-08-31 VITALS — BODY MASS INDEX: 44.5 KG/M2

## 2022-08-31 DIAGNOSIS — E11.65: ICD-10-CM

## 2022-08-31 DIAGNOSIS — Z91.013: ICD-10-CM

## 2022-08-31 DIAGNOSIS — F12.20: ICD-10-CM

## 2022-08-31 DIAGNOSIS — E78.5: ICD-10-CM

## 2022-08-31 DIAGNOSIS — F20.9: ICD-10-CM

## 2022-08-31 DIAGNOSIS — I10: ICD-10-CM

## 2022-08-31 DIAGNOSIS — F14.20: ICD-10-CM

## 2022-08-31 DIAGNOSIS — Z86.11: ICD-10-CM

## 2022-08-31 DIAGNOSIS — F41.9: ICD-10-CM

## 2022-08-31 DIAGNOSIS — F10.230: Primary | ICD-10-CM

## 2022-08-31 DIAGNOSIS — Z79.4: ICD-10-CM

## 2022-08-31 DIAGNOSIS — F39: ICD-10-CM

## 2022-08-31 DIAGNOSIS — F31.89: ICD-10-CM

## 2022-08-31 DIAGNOSIS — Z59.00: ICD-10-CM

## 2022-08-31 DIAGNOSIS — F17.210: ICD-10-CM

## 2022-08-31 DIAGNOSIS — F19.24: ICD-10-CM

## 2022-08-31 DIAGNOSIS — Z56.0: ICD-10-CM

## 2022-08-31 DIAGNOSIS — M54.59: ICD-10-CM

## 2022-08-31 DIAGNOSIS — G89.29: ICD-10-CM

## 2022-08-31 DIAGNOSIS — F19.282: ICD-10-CM

## 2022-08-31 DIAGNOSIS — I83.93: ICD-10-CM

## 2022-08-31 DIAGNOSIS — Z91.51: ICD-10-CM

## 2022-08-31 DIAGNOSIS — E66.9: ICD-10-CM

## 2022-08-31 LAB
ALBUMIN SERPL-MCNC: 3.3 G/DL (ref 3.4–5)
ALP SERPL-CCNC: 90 U/L (ref 45–117)
ALT SERPL-CCNC: 13 U/L (ref 13–61)
ANION GAP SERPL CALC-SCNC: 6 MMOL/L (ref 8–16)
AST SERPL-CCNC: 16 U/L (ref 15–37)
BILIRUB SERPL-MCNC: 0.4 MG/DL (ref 0.2–1)
BUN SERPL-MCNC: 11.1 MG/DL (ref 7–18)
CALCIUM SERPL-MCNC: 9.5 MG/DL (ref 8.5–10.1)
CHLORIDE SERPL-SCNC: 102 MMOL/L (ref 98–107)
CO2 SERPL-SCNC: 30 MMOL/L (ref 21–32)
CREAT SERPL-MCNC: 0.9 MG/DL (ref 0.55–1.3)
DEPRECATED RDW RBC AUTO: 14.5 % (ref 11.9–15.9)
GLUCOSE SERPL-MCNC: 296 MG/DL (ref 74–106)
HCT VFR BLD CALC: 40.6 % (ref 35.4–49)
HGB BLD-MCNC: 13.7 GM/DL (ref 11.7–16.9)
MCH RBC QN AUTO: 30.9 PG (ref 25.7–33.7)
MCHC RBC AUTO-ENTMCNC: 33.8 G/DL (ref 32–35.9)
MCV RBC: 91.3 FL (ref 80–96)
PLATELET # BLD AUTO: 164 10^3/UL (ref 134–434)
PMV BLD: 8.3 FL (ref 7.5–11.1)
PROT SERPL-MCNC: 7.2 G/DL (ref 6.4–8.2)
RBC # BLD AUTO: 4.44 M/MM3 (ref 4–5.6)
SODIUM SERPL-SCNC: 138 MMOL/L (ref 136–145)
WBC # BLD AUTO: 6.6 K/MM3 (ref 4–10)

## 2022-08-31 PROCEDURE — U0003 INFECTIOUS AGENT DETECTION BY NUCLEIC ACID (DNA OR RNA); SEVERE ACUTE RESPIRATORY SYNDROME CORONAVIRUS 2 (SARS-COV-2) (CORONAVIRUS DISEASE [COVID-19]), AMPLIFIED PROBE TECHNIQUE, MAKING USE OF HIGH THROUGHPUT TECHNOLOGIES AS DESCRIBED BY CMS-2020-01-R: HCPCS

## 2022-08-31 PROCEDURE — U0005 INFEC AGEN DETEC AMPLI PROBE: HCPCS

## 2022-08-31 PROCEDURE — HZ2ZZZZ DETOXIFICATION SERVICES FOR SUBSTANCE ABUSE TREATMENT: ICD-10-PCS | Performed by: SURGERY

## 2022-08-31 RX ADMIN — HYDROXYZINE PAMOATE SCH MG: 25 CAPSULE ORAL at 17:56

## 2022-08-31 RX ADMIN — METFORMIN HYDROCHLORIDE SCH MG: 500 TABLET ORAL at 17:55

## 2022-08-31 RX ADMIN — HYDROXYZINE PAMOATE SCH MG: 25 CAPSULE ORAL at 13:16

## 2022-08-31 RX ADMIN — HYDROXYZINE PAMOATE SCH: 25 CAPSULE ORAL at 23:11

## 2022-08-31 RX ADMIN — ATORVASTATIN CALCIUM SCH: 20 TABLET, FILM COATED ORAL at 23:09

## 2022-08-31 RX ADMIN — NICOTINE SCH: 14 PATCH, EXTENDED RELEASE TRANSDERMAL at 13:16

## 2022-08-31 RX ADMIN — LISINOPRIL SCH MG: 5 TABLET ORAL at 13:15

## 2022-08-31 RX ADMIN — Medication SCH: at 23:09

## 2022-08-31 RX ADMIN — Medication SCH TAB: at 13:15

## 2022-08-31 RX ADMIN — ASPIRIN 81 MG SCH MG: 81 TABLET ORAL at 13:15

## 2022-08-31 RX ADMIN — Medication SCH: at 23:12

## 2022-08-31 SDOH — ECONOMIC STABILITY - INCOME SECURITY: UNEMPLOYMENT, UNSPECIFIED: Z56.0

## 2022-08-31 SDOH — ECONOMIC STABILITY - HOUSING INSECURITY: HOMELESSNESS UNSPECIFIED: Z59.00

## 2022-09-01 RX ADMIN — HYDROXYZINE PAMOATE SCH MG: 25 CAPSULE ORAL at 11:04

## 2022-09-01 RX ADMIN — HYDROXYZINE PAMOATE SCH: 25 CAPSULE ORAL at 14:51

## 2022-09-01 RX ADMIN — Medication SCH: at 23:59

## 2022-09-01 RX ADMIN — IBUPROFEN PRN MG: 600 TABLET, FILM COATED ORAL at 11:05

## 2022-09-01 RX ADMIN — METFORMIN HYDROCHLORIDE SCH MG: 500 TABLET ORAL at 06:24

## 2022-09-01 RX ADMIN — TRAZODONE HYDROCHLORIDE SCH: 50 TABLET ORAL at 23:59

## 2022-09-01 RX ADMIN — ATORVASTATIN CALCIUM SCH: 20 TABLET, FILM COATED ORAL at 23:59

## 2022-09-01 RX ADMIN — HYDROXYZINE PAMOATE SCH: 25 CAPSULE ORAL at 23:59

## 2022-09-01 RX ADMIN — ASPIRIN 81 MG SCH MG: 81 TABLET ORAL at 11:04

## 2022-09-01 RX ADMIN — METFORMIN HYDROCHLORIDE SCH MG: 500 TABLET ORAL at 18:30

## 2022-09-01 RX ADMIN — Medication SCH TAB: at 11:03

## 2022-09-01 RX ADMIN — HYDROXYZINE PAMOATE SCH: 25 CAPSULE ORAL at 18:39

## 2022-09-01 RX ADMIN — NICOTINE SCH: 14 PATCH, EXTENDED RELEASE TRANSDERMAL at 11:05

## 2022-09-01 RX ADMIN — INSULIN DETEMIR SCH UNITS: 100 INJECTION, SOLUTION SUBCUTANEOUS at 08:06

## 2022-09-01 RX ADMIN — LISINOPRIL SCH MG: 5 TABLET ORAL at 11:04

## 2022-09-01 RX ADMIN — HYDROXYZINE PAMOATE SCH: 25 CAPSULE ORAL at 05:24

## 2022-09-02 RX ADMIN — HYDROXYZINE PAMOATE SCH: 25 CAPSULE ORAL at 19:23

## 2022-09-02 RX ADMIN — INSULIN DETEMIR SCH UNITS: 100 INJECTION, SOLUTION SUBCUTANEOUS at 06:40

## 2022-09-02 RX ADMIN — Medication SCH: at 23:41

## 2022-09-02 RX ADMIN — ATORVASTATIN CALCIUM SCH: 20 TABLET, FILM COATED ORAL at 23:41

## 2022-09-02 RX ADMIN — CITALOPRAM HYDROBROMIDE SCH MG: 20 TABLET ORAL at 10:57

## 2022-09-02 RX ADMIN — LISINOPRIL SCH MG: 5 TABLET ORAL at 10:57

## 2022-09-02 RX ADMIN — HYDROXYZINE PAMOATE SCH MG: 25 CAPSULE ORAL at 10:57

## 2022-09-02 RX ADMIN — TRAZODONE HYDROCHLORIDE SCH: 50 TABLET ORAL at 23:41

## 2022-09-02 RX ADMIN — HYDROXYZINE PAMOATE SCH: 25 CAPSULE ORAL at 15:11

## 2022-09-02 RX ADMIN — HYDROXYZINE PAMOATE SCH MG: 25 CAPSULE ORAL at 05:34

## 2022-09-02 RX ADMIN — Medication SCH TAB: at 10:57

## 2022-09-02 RX ADMIN — METFORMIN HYDROCHLORIDE SCH MG: 500 TABLET ORAL at 06:38

## 2022-09-02 RX ADMIN — ASPIRIN 81 MG SCH MG: 81 TABLET ORAL at 10:57

## 2022-09-02 RX ADMIN — NICOTINE SCH: 14 PATCH, EXTENDED RELEASE TRANSDERMAL at 11:05

## 2022-09-02 RX ADMIN — HYDROXYZINE PAMOATE SCH: 25 CAPSULE ORAL at 23:41

## 2022-09-02 RX ADMIN — METFORMIN HYDROCHLORIDE SCH MG: 500 TABLET ORAL at 18:25

## 2022-09-03 RX ADMIN — HYDROXYZINE PAMOATE SCH: 25 CAPSULE ORAL at 06:26

## 2022-09-03 RX ADMIN — NICOTINE SCH: 14 PATCH, EXTENDED RELEASE TRANSDERMAL at 10:40

## 2022-09-03 RX ADMIN — HYDROXYZINE PAMOATE SCH: 25 CAPSULE ORAL at 18:12

## 2022-09-03 RX ADMIN — Medication SCH MG: at 22:01

## 2022-09-03 RX ADMIN — Medication SCH: at 10:34

## 2022-09-03 RX ADMIN — ASPIRIN 81 MG SCH: 81 TABLET ORAL at 10:34

## 2022-09-03 RX ADMIN — LISINOPRIL SCH MG: 5 TABLET ORAL at 10:33

## 2022-09-03 RX ADMIN — METFORMIN HYDROCHLORIDE SCH: 500 TABLET ORAL at 18:11

## 2022-09-03 RX ADMIN — HYDROXYZINE PAMOATE SCH MG: 25 CAPSULE ORAL at 22:01

## 2022-09-03 RX ADMIN — HYDROXYZINE PAMOATE SCH: 25 CAPSULE ORAL at 10:34

## 2022-09-03 RX ADMIN — HYDROXYZINE PAMOATE SCH: 25 CAPSULE ORAL at 13:42

## 2022-09-03 RX ADMIN — IBUPROFEN PRN MG: 600 TABLET, FILM COATED ORAL at 07:25

## 2022-09-03 RX ADMIN — METFORMIN HYDROCHLORIDE SCH MG: 500 TABLET ORAL at 07:24

## 2022-09-03 RX ADMIN — CITALOPRAM HYDROBROMIDE SCH MG: 20 TABLET ORAL at 10:33

## 2022-09-03 RX ADMIN — TRAZODONE HYDROCHLORIDE SCH MG: 50 TABLET ORAL at 22:01

## 2022-09-03 RX ADMIN — INSULIN DETEMIR SCH UNITS: 100 INJECTION, SOLUTION SUBCUTANEOUS at 07:27

## 2022-09-03 RX ADMIN — ATORVASTATIN CALCIUM SCH MG: 20 TABLET, FILM COATED ORAL at 22:01

## 2022-09-04 RX ADMIN — METFORMIN HYDROCHLORIDE SCH MG: 500 TABLET ORAL at 17:52

## 2022-09-04 RX ADMIN — ASPIRIN 81 MG SCH MG: 81 TABLET ORAL at 10:18

## 2022-09-04 RX ADMIN — HYDROXYZINE PAMOATE SCH MG: 25 CAPSULE ORAL at 17:52

## 2022-09-04 RX ADMIN — HYDROXYZINE PAMOATE SCH: 25 CAPSULE ORAL at 23:11

## 2022-09-04 RX ADMIN — Medication SCH: at 23:11

## 2022-09-04 RX ADMIN — CITALOPRAM HYDROBROMIDE SCH MG: 20 TABLET ORAL at 10:18

## 2022-09-04 RX ADMIN — INSULIN DETEMIR SCH UNITS: 100 INJECTION, SOLUTION SUBCUTANEOUS at 08:24

## 2022-09-04 RX ADMIN — HYDROXYZINE PAMOATE SCH: 25 CAPSULE ORAL at 10:19

## 2022-09-04 RX ADMIN — NICOTINE SCH: 14 PATCH, EXTENDED RELEASE TRANSDERMAL at 10:18

## 2022-09-04 RX ADMIN — AMLODIPINE BESYLATE SCH MG: 2.5 TABLET ORAL at 15:05

## 2022-09-04 RX ADMIN — ATORVASTATIN CALCIUM SCH: 20 TABLET, FILM COATED ORAL at 23:11

## 2022-09-04 RX ADMIN — METFORMIN HYDROCHLORIDE SCH MG: 500 TABLET ORAL at 08:24

## 2022-09-04 RX ADMIN — HYDROXYZINE PAMOATE SCH: 25 CAPSULE ORAL at 14:18

## 2022-09-04 RX ADMIN — LISINOPRIL SCH MG: 5 TABLET ORAL at 10:18

## 2022-09-04 RX ADMIN — TRAZODONE HYDROCHLORIDE SCH: 50 TABLET ORAL at 23:11

## 2022-09-04 RX ADMIN — Medication SCH: at 10:18

## 2022-09-04 RX ADMIN — HYDROXYZINE PAMOATE SCH: 25 CAPSULE ORAL at 07:38

## 2022-09-05 ENCOUNTER — HOSPITAL ENCOUNTER (INPATIENT)
Dept: HOSPITAL 74 - YASAS | Age: 47
LOS: 11 days | Discharge: HOME | DRG: 772 | End: 2022-09-16
Attending: PSYCHIATRY & NEUROLOGY | Admitting: ALLERGY & IMMUNOLOGY
Payer: COMMERCIAL

## 2022-09-05 VITALS
SYSTOLIC BLOOD PRESSURE: 142 MMHG | DIASTOLIC BLOOD PRESSURE: 89 MMHG | HEART RATE: 57 BPM | TEMPERATURE: 97 F | RESPIRATION RATE: 18 BRPM

## 2022-09-05 DIAGNOSIS — Z56.0: ICD-10-CM

## 2022-09-05 DIAGNOSIS — E11.40: ICD-10-CM

## 2022-09-05 DIAGNOSIS — E78.5: ICD-10-CM

## 2022-09-05 DIAGNOSIS — Z91.013: ICD-10-CM

## 2022-09-05 DIAGNOSIS — R26.2: ICD-10-CM

## 2022-09-05 DIAGNOSIS — F19.24: ICD-10-CM

## 2022-09-05 DIAGNOSIS — F32.A: ICD-10-CM

## 2022-09-05 DIAGNOSIS — I10: ICD-10-CM

## 2022-09-05 DIAGNOSIS — F12.20: ICD-10-CM

## 2022-09-05 DIAGNOSIS — Z79.4: ICD-10-CM

## 2022-09-05 DIAGNOSIS — J45.909: ICD-10-CM

## 2022-09-05 DIAGNOSIS — Z99.89: ICD-10-CM

## 2022-09-05 DIAGNOSIS — F10.20: Primary | ICD-10-CM

## 2022-09-05 DIAGNOSIS — F14.20: ICD-10-CM

## 2022-09-05 DIAGNOSIS — E66.01: ICD-10-CM

## 2022-09-05 DIAGNOSIS — F17.210: ICD-10-CM

## 2022-09-05 PROCEDURE — HZ42ZZZ GROUP COUNSELING FOR SUBSTANCE ABUSE TREATMENT, COGNITIVE-BEHAVIORAL: ICD-10-PCS | Performed by: PSYCHIATRY & NEUROLOGY

## 2022-09-05 RX ADMIN — Medication SCH TAB: at 10:52

## 2022-09-05 RX ADMIN — HYDROXYZINE PAMOATE SCH MG: 25 CAPSULE ORAL at 10:52

## 2022-09-05 RX ADMIN — GABAPENTIN SCH MG: 100 CAPSULE ORAL at 21:43

## 2022-09-05 RX ADMIN — METFORMIN HYDROCHLORIDE SCH MG: 500 TABLET ORAL at 06:00

## 2022-09-05 RX ADMIN — CITALOPRAM HYDROBROMIDE SCH MG: 20 TABLET ORAL at 10:52

## 2022-09-05 RX ADMIN — INSULIN ASPART SCH UNITS: 100 INJECTION, SOLUTION INTRAVENOUS; SUBCUTANEOUS at 17:23

## 2022-09-05 RX ADMIN — Medication SCH MG: at 21:44

## 2022-09-05 RX ADMIN — TRAZODONE HYDROCHLORIDE SCH MG: 50 TABLET ORAL at 21:43

## 2022-09-05 RX ADMIN — INSULIN ASPART SCH: 100 INJECTION, SOLUTION INTRAVENOUS; SUBCUTANEOUS at 22:00

## 2022-09-05 RX ADMIN — NICOTINE SCH: 14 PATCH, EXTENDED RELEASE TRANSDERMAL at 11:07

## 2022-09-05 RX ADMIN — AMLODIPINE BESYLATE SCH MG: 2.5 TABLET ORAL at 11:20

## 2022-09-05 RX ADMIN — METFORMIN HYDROCHLORIDE SCH MG: 500 TABLET ORAL at 17:22

## 2022-09-05 RX ADMIN — HYDROXYZINE PAMOATE SCH MG: 25 CAPSULE ORAL at 06:01

## 2022-09-05 RX ADMIN — INSULIN DETEMIR SCH UNITS: 100 INJECTION, SOLUTION SUBCUTANEOUS at 07:57

## 2022-09-05 RX ADMIN — LISINOPRIL SCH MG: 5 TABLET ORAL at 10:52

## 2022-09-05 RX ADMIN — ASPIRIN 81 MG SCH MG: 81 TABLET ORAL at 10:52

## 2022-09-05 SDOH — ECONOMIC STABILITY - INCOME SECURITY: UNEMPLOYMENT, UNSPECIFIED: Z56.0

## 2022-09-06 LAB
APPEARANCE UR: CLEAR
BILIRUB UR STRIP.AUTO-MCNC: NEGATIVE MG/DL
COLOR UR: YELLOW
KETONES UR QL STRIP: NEGATIVE
LEUKOCYTE ESTERASE UR QL STRIP.AUTO: NEGATIVE
NITRITE UR QL STRIP: NEGATIVE
PH UR: 6 [PH] (ref 5–8)
PROT UR QL STRIP: (no result)
PROT UR QL STRIP: NEGATIVE
SP GR UR: 1.02 (ref 1.01–1.03)
UROBILINOGEN UR STRIP-MCNC: 0.2 MG/DL (ref 0.2–1)

## 2022-09-06 RX ADMIN — Medication PRN MG: at 21:21

## 2022-09-06 RX ADMIN — METFORMIN HYDROCHLORIDE SCH MG: 500 TABLET ORAL at 06:36

## 2022-09-06 RX ADMIN — METFORMIN HYDROCHLORIDE SCH MG: 500 TABLET ORAL at 16:55

## 2022-09-06 RX ADMIN — CITALOPRAM HYDROBROMIDE SCH MG: 20 TABLET ORAL at 10:47

## 2022-09-06 RX ADMIN — Medication SCH: at 10:49

## 2022-09-06 RX ADMIN — GABAPENTIN SCH MG: 100 CAPSULE ORAL at 13:10

## 2022-09-06 RX ADMIN — INSULIN ASPART SCH UNITS: 100 INJECTION, SOLUTION INTRAVENOUS; SUBCUTANEOUS at 06:37

## 2022-09-06 RX ADMIN — GABAPENTIN SCH MG: 100 CAPSULE ORAL at 06:36

## 2022-09-06 RX ADMIN — TRAZODONE HYDROCHLORIDE SCH MG: 50 TABLET ORAL at 21:21

## 2022-09-06 RX ADMIN — AMLODIPINE BESYLATE SCH MG: 2.5 TABLET ORAL at 10:47

## 2022-09-06 RX ADMIN — INSULIN ASPART SCH: 100 INJECTION, SOLUTION INTRAVENOUS; SUBCUTANEOUS at 16:57

## 2022-09-06 RX ADMIN — Medication SCH MG: at 21:21

## 2022-09-06 RX ADMIN — INSULIN DETEMIR SCH UNIT: 100 INJECTION, SOLUTION SUBCUTANEOUS at 06:55

## 2022-09-06 RX ADMIN — ASPIRIN 81 MG SCH MG: 81 TABLET ORAL at 10:47

## 2022-09-06 RX ADMIN — LISINOPRIL SCH MG: 5 TABLET ORAL at 10:47

## 2022-09-06 RX ADMIN — INSULIN ASPART SCH: 100 INJECTION, SOLUTION INTRAVENOUS; SUBCUTANEOUS at 21:24

## 2022-09-06 RX ADMIN — INSULIN ASPART SCH UNITS: 100 INJECTION, SOLUTION INTRAVENOUS; SUBCUTANEOUS at 12:05

## 2022-09-06 RX ADMIN — GABAPENTIN SCH MG: 100 CAPSULE ORAL at 21:21

## 2022-09-07 RX ADMIN — METFORMIN HYDROCHLORIDE SCH MG: 500 TABLET ORAL at 06:46

## 2022-09-07 RX ADMIN — GABAPENTIN SCH MG: 100 CAPSULE ORAL at 21:40

## 2022-09-07 RX ADMIN — INSULIN ASPART SCH: 100 INJECTION, SOLUTION INTRAVENOUS; SUBCUTANEOUS at 21:42

## 2022-09-07 RX ADMIN — INSULIN ASPART SCH UNITS: 100 INJECTION, SOLUTION INTRAVENOUS; SUBCUTANEOUS at 12:14

## 2022-09-07 RX ADMIN — GABAPENTIN SCH MG: 100 CAPSULE ORAL at 06:46

## 2022-09-07 RX ADMIN — Medication SCH MG: at 21:40

## 2022-09-07 RX ADMIN — LISINOPRIL SCH MG: 5 TABLET ORAL at 10:29

## 2022-09-07 RX ADMIN — CITALOPRAM HYDROBROMIDE SCH MG: 20 TABLET ORAL at 10:29

## 2022-09-07 RX ADMIN — ASPIRIN 81 MG SCH MG: 81 TABLET ORAL at 10:29

## 2022-09-07 RX ADMIN — INSULIN ASPART SCH: 100 INJECTION, SOLUTION INTRAVENOUS; SUBCUTANEOUS at 16:43

## 2022-09-07 RX ADMIN — INSULIN DETEMIR SCH UNIT: 100 INJECTION, SOLUTION SUBCUTANEOUS at 07:04

## 2022-09-07 RX ADMIN — GABAPENTIN SCH: 100 CAPSULE ORAL at 15:37

## 2022-09-07 RX ADMIN — TRAZODONE HYDROCHLORIDE SCH MG: 50 TABLET ORAL at 21:40

## 2022-09-07 RX ADMIN — METFORMIN HYDROCHLORIDE SCH MG: 500 TABLET ORAL at 16:42

## 2022-09-07 RX ADMIN — Medication SCH TAB: at 10:29

## 2022-09-07 RX ADMIN — INSULIN ASPART SCH: 100 INJECTION, SOLUTION INTRAVENOUS; SUBCUTANEOUS at 06:47

## 2022-09-07 RX ADMIN — Medication PRN MG: at 21:40

## 2022-09-07 RX ADMIN — AMLODIPINE BESYLATE SCH MG: 2.5 TABLET ORAL at 10:29

## 2022-09-08 RX ADMIN — CITALOPRAM HYDROBROMIDE SCH MG: 20 TABLET ORAL at 10:18

## 2022-09-08 RX ADMIN — INSULIN ASPART SCH: 100 INJECTION, SOLUTION INTRAVENOUS; SUBCUTANEOUS at 06:33

## 2022-09-08 RX ADMIN — METFORMIN HYDROCHLORIDE SCH MG: 500 TABLET ORAL at 06:31

## 2022-09-08 RX ADMIN — TRAZODONE HYDROCHLORIDE SCH MG: 50 TABLET ORAL at 21:13

## 2022-09-08 RX ADMIN — AMLODIPINE BESYLATE SCH MG: 2.5 TABLET ORAL at 10:18

## 2022-09-08 RX ADMIN — INSULIN ASPART SCH UNITS: 100 INJECTION, SOLUTION INTRAVENOUS; SUBCUTANEOUS at 12:12

## 2022-09-08 RX ADMIN — Medication SCH TAB: at 10:18

## 2022-09-08 RX ADMIN — METFORMIN HYDROCHLORIDE SCH MG: 500 TABLET ORAL at 16:51

## 2022-09-08 RX ADMIN — GABAPENTIN SCH: 100 CAPSULE ORAL at 14:42

## 2022-09-08 RX ADMIN — GABAPENTIN SCH MG: 100 CAPSULE ORAL at 21:13

## 2022-09-08 RX ADMIN — Medication PRN MG: at 21:13

## 2022-09-08 RX ADMIN — GABAPENTIN SCH MG: 100 CAPSULE ORAL at 06:32

## 2022-09-08 RX ADMIN — INSULIN ASPART SCH UNITS: 100 INJECTION, SOLUTION INTRAVENOUS; SUBCUTANEOUS at 21:15

## 2022-09-08 RX ADMIN — INSULIN DETEMIR SCH UNIT: 100 INJECTION, SOLUTION SUBCUTANEOUS at 06:59

## 2022-09-08 RX ADMIN — INSULIN ASPART SCH: 100 INJECTION, SOLUTION INTRAVENOUS; SUBCUTANEOUS at 16:52

## 2022-09-08 RX ADMIN — LISINOPRIL SCH MG: 5 TABLET ORAL at 10:18

## 2022-09-08 RX ADMIN — Medication SCH MG: at 21:13

## 2022-09-08 RX ADMIN — ASPIRIN 81 MG SCH MG: 81 TABLET ORAL at 10:18

## 2022-09-09 RX ADMIN — TRAZODONE HYDROCHLORIDE SCH MG: 50 TABLET ORAL at 22:31

## 2022-09-09 RX ADMIN — Medication SCH MG: at 22:32

## 2022-09-09 RX ADMIN — Medication PRN MG: at 22:32

## 2022-09-09 RX ADMIN — INSULIN ASPART SCH: 100 INJECTION, SOLUTION INTRAVENOUS; SUBCUTANEOUS at 06:15

## 2022-09-09 RX ADMIN — INSULIN ASPART SCH UNITS: 100 INJECTION, SOLUTION INTRAVENOUS; SUBCUTANEOUS at 11:43

## 2022-09-09 RX ADMIN — CITALOPRAM HYDROBROMIDE SCH MG: 20 TABLET ORAL at 09:59

## 2022-09-09 RX ADMIN — INSULIN ASPART SCH UNITS: 100 INJECTION, SOLUTION INTRAVENOUS; SUBCUTANEOUS at 16:21

## 2022-09-09 RX ADMIN — LISINOPRIL SCH MG: 5 TABLET ORAL at 09:59

## 2022-09-09 RX ADMIN — INSULIN ASPART SCH: 100 INJECTION, SOLUTION INTRAVENOUS; SUBCUTANEOUS at 22:33

## 2022-09-09 RX ADMIN — GABAPENTIN SCH MG: 100 CAPSULE ORAL at 14:46

## 2022-09-09 RX ADMIN — GABAPENTIN SCH MG: 100 CAPSULE ORAL at 22:31

## 2022-09-09 RX ADMIN — AMLODIPINE BESYLATE SCH MG: 2.5 TABLET ORAL at 10:00

## 2022-09-09 RX ADMIN — Medication SCH TAB: at 10:00

## 2022-09-09 RX ADMIN — METFORMIN HYDROCHLORIDE SCH MG: 500 TABLET ORAL at 06:14

## 2022-09-09 RX ADMIN — INSULIN DETEMIR SCH UNIT: 100 INJECTION, SOLUTION SUBCUTANEOUS at 06:57

## 2022-09-09 RX ADMIN — GABAPENTIN SCH MG: 100 CAPSULE ORAL at 06:14

## 2022-09-09 RX ADMIN — METFORMIN HYDROCHLORIDE SCH MG: 500 TABLET ORAL at 16:20

## 2022-09-09 RX ADMIN — ASPIRIN 81 MG SCH MG: 81 TABLET ORAL at 09:59

## 2022-09-10 RX ADMIN — AMLODIPINE BESYLATE SCH MG: 2.5 TABLET ORAL at 10:22

## 2022-09-10 RX ADMIN — INSULIN ASPART SCH UNITS: 100 INJECTION, SOLUTION INTRAVENOUS; SUBCUTANEOUS at 17:56

## 2022-09-10 RX ADMIN — TRAZODONE HYDROCHLORIDE SCH MG: 50 TABLET ORAL at 21:10

## 2022-09-10 RX ADMIN — INSULIN DETEMIR SCH UNIT: 100 INJECTION, SOLUTION SUBCUTANEOUS at 06:39

## 2022-09-10 RX ADMIN — CITALOPRAM HYDROBROMIDE SCH MG: 20 TABLET ORAL at 10:22

## 2022-09-10 RX ADMIN — INSULIN ASPART SCH: 100 INJECTION, SOLUTION INTRAVENOUS; SUBCUTANEOUS at 06:50

## 2022-09-10 RX ADMIN — Medication SCH MG: at 21:11

## 2022-09-10 RX ADMIN — LISINOPRIL SCH MG: 5 TABLET ORAL at 10:22

## 2022-09-10 RX ADMIN — GABAPENTIN SCH MG: 100 CAPSULE ORAL at 14:07

## 2022-09-10 RX ADMIN — INSULIN ASPART SCH: 100 INJECTION, SOLUTION INTRAVENOUS; SUBCUTANEOUS at 12:05

## 2022-09-10 RX ADMIN — GABAPENTIN SCH MG: 100 CAPSULE ORAL at 21:10

## 2022-09-10 RX ADMIN — INSULIN ASPART SCH: 100 INJECTION, SOLUTION INTRAVENOUS; SUBCUTANEOUS at 21:11

## 2022-09-10 RX ADMIN — ASPIRIN 81 MG SCH MG: 81 TABLET ORAL at 10:22

## 2022-09-10 RX ADMIN — Medication PRN MG: at 21:11

## 2022-09-10 RX ADMIN — METFORMIN HYDROCHLORIDE SCH MG: 500 TABLET ORAL at 06:49

## 2022-09-10 RX ADMIN — Medication SCH TAB: at 10:22

## 2022-09-10 RX ADMIN — METFORMIN HYDROCHLORIDE SCH MG: 500 TABLET ORAL at 17:55

## 2022-09-10 RX ADMIN — GABAPENTIN SCH MG: 100 CAPSULE ORAL at 06:49

## 2022-09-11 RX ADMIN — CITALOPRAM HYDROBROMIDE SCH MG: 20 TABLET ORAL at 10:03

## 2022-09-11 RX ADMIN — GABAPENTIN SCH MG: 100 CAPSULE ORAL at 14:01

## 2022-09-11 RX ADMIN — INSULIN ASPART SCH UNITS: 100 INJECTION, SOLUTION INTRAVENOUS; SUBCUTANEOUS at 21:12

## 2022-09-11 RX ADMIN — Medication SCH TAB: at 10:03

## 2022-09-11 RX ADMIN — INSULIN ASPART SCH UNITS: 100 INJECTION, SOLUTION INTRAVENOUS; SUBCUTANEOUS at 17:54

## 2022-09-11 RX ADMIN — INSULIN DETEMIR SCH UNIT: 100 INJECTION, SOLUTION SUBCUTANEOUS at 06:50

## 2022-09-11 RX ADMIN — LISINOPRIL SCH MG: 5 TABLET ORAL at 10:03

## 2022-09-11 RX ADMIN — METFORMIN HYDROCHLORIDE SCH MG: 500 TABLET ORAL at 06:25

## 2022-09-11 RX ADMIN — ASPIRIN 81 MG SCH MG: 81 TABLET ORAL at 10:03

## 2022-09-11 RX ADMIN — Medication SCH MG: at 21:11

## 2022-09-11 RX ADMIN — METFORMIN HYDROCHLORIDE SCH MG: 500 TABLET ORAL at 16:43

## 2022-09-11 RX ADMIN — TRAZODONE HYDROCHLORIDE SCH MG: 50 TABLET ORAL at 21:11

## 2022-09-11 RX ADMIN — INSULIN ASPART SCH: 100 INJECTION, SOLUTION INTRAVENOUS; SUBCUTANEOUS at 06:26

## 2022-09-11 RX ADMIN — INSULIN ASPART SCH: 100 INJECTION, SOLUTION INTRAVENOUS; SUBCUTANEOUS at 11:43

## 2022-09-11 RX ADMIN — GABAPENTIN SCH MG: 100 CAPSULE ORAL at 06:25

## 2022-09-11 RX ADMIN — AMLODIPINE BESYLATE SCH MG: 2.5 TABLET ORAL at 10:03

## 2022-09-11 RX ADMIN — Medication PRN MG: at 21:11

## 2022-09-11 RX ADMIN — GABAPENTIN SCH MG: 100 CAPSULE ORAL at 21:11

## 2022-09-12 RX ADMIN — GABAPENTIN SCH MG: 100 CAPSULE ORAL at 21:10

## 2022-09-12 RX ADMIN — METFORMIN HYDROCHLORIDE SCH MG: 500 TABLET ORAL at 06:06

## 2022-09-12 RX ADMIN — GABAPENTIN SCH MG: 100 CAPSULE ORAL at 14:09

## 2022-09-12 RX ADMIN — TRAZODONE HYDROCHLORIDE SCH MG: 50 TABLET ORAL at 21:10

## 2022-09-12 RX ADMIN — Medication SCH MG: at 21:10

## 2022-09-12 RX ADMIN — GABAPENTIN SCH MG: 100 CAPSULE ORAL at 06:06

## 2022-09-12 RX ADMIN — Medication SCH TAB: at 10:07

## 2022-09-12 RX ADMIN — Medication PRN MG: at 21:10

## 2022-09-12 RX ADMIN — METFORMIN HYDROCHLORIDE SCH MG: 500 TABLET ORAL at 16:32

## 2022-09-12 RX ADMIN — INSULIN ASPART SCH: 100 INJECTION, SOLUTION INTRAVENOUS; SUBCUTANEOUS at 06:07

## 2022-09-12 RX ADMIN — CITALOPRAM HYDROBROMIDE SCH MG: 20 TABLET ORAL at 10:07

## 2022-09-12 RX ADMIN — AMLODIPINE BESYLATE SCH MG: 2.5 TABLET ORAL at 10:07

## 2022-09-12 RX ADMIN — INSULIN DETEMIR SCH UNIT: 100 INJECTION, SOLUTION SUBCUTANEOUS at 06:58

## 2022-09-12 RX ADMIN — INSULIN ASPART SCH UNITS: 100 INJECTION, SOLUTION INTRAVENOUS; SUBCUTANEOUS at 16:35

## 2022-09-12 RX ADMIN — INSULIN ASPART SCH UNITS: 100 INJECTION, SOLUTION INTRAVENOUS; SUBCUTANEOUS at 11:48

## 2022-09-12 RX ADMIN — ASPIRIN 81 MG SCH MG: 81 TABLET ORAL at 10:07

## 2022-09-12 RX ADMIN — LISINOPRIL SCH MG: 5 TABLET ORAL at 10:07

## 2022-09-12 RX ADMIN — INSULIN ASPART SCH: 100 INJECTION, SOLUTION INTRAVENOUS; SUBCUTANEOUS at 21:10

## 2022-09-13 VITALS — RESPIRATION RATE: 18 BRPM

## 2022-09-13 RX ADMIN — Medication PRN MG: at 21:08

## 2022-09-13 RX ADMIN — INSULIN DETEMIR SCH UNIT: 100 INJECTION, SOLUTION SUBCUTANEOUS at 06:44

## 2022-09-13 RX ADMIN — INSULIN ASPART SCH UNITS: 100 INJECTION, SOLUTION INTRAVENOUS; SUBCUTANEOUS at 06:45

## 2022-09-13 RX ADMIN — INSULIN ASPART SCH: 100 INJECTION, SOLUTION INTRAVENOUS; SUBCUTANEOUS at 16:22

## 2022-09-13 RX ADMIN — INSULIN ASPART SCH: 100 INJECTION, SOLUTION INTRAVENOUS; SUBCUTANEOUS at 13:06

## 2022-09-13 RX ADMIN — GABAPENTIN SCH MG: 100 CAPSULE ORAL at 21:08

## 2022-09-13 RX ADMIN — INSULIN ASPART SCH UNITS: 100 INJECTION, SOLUTION INTRAVENOUS; SUBCUTANEOUS at 21:09

## 2022-09-13 RX ADMIN — TRAZODONE HYDROCHLORIDE SCH MG: 50 TABLET ORAL at 21:08

## 2022-09-13 RX ADMIN — ASPIRIN 81 MG SCH MG: 81 TABLET ORAL at 10:11

## 2022-09-13 RX ADMIN — LISINOPRIL SCH MG: 5 TABLET ORAL at 10:11

## 2022-09-13 RX ADMIN — CITALOPRAM HYDROBROMIDE SCH MG: 20 TABLET ORAL at 10:12

## 2022-09-13 RX ADMIN — METFORMIN HYDROCHLORIDE SCH MG: 500 TABLET ORAL at 16:21

## 2022-09-13 RX ADMIN — METFORMIN HYDROCHLORIDE SCH MG: 500 TABLET ORAL at 06:06

## 2022-09-13 RX ADMIN — GABAPENTIN SCH MG: 100 CAPSULE ORAL at 06:05

## 2022-09-13 RX ADMIN — Medication SCH TAB: at 10:11

## 2022-09-13 RX ADMIN — Medication SCH MG: at 21:08

## 2022-09-13 RX ADMIN — GABAPENTIN SCH MG: 100 CAPSULE ORAL at 13:57

## 2022-09-13 RX ADMIN — AMLODIPINE BESYLATE SCH MG: 2.5 TABLET ORAL at 10:12

## 2022-09-14 RX ADMIN — GABAPENTIN SCH MG: 100 CAPSULE ORAL at 21:17

## 2022-09-14 RX ADMIN — INSULIN ASPART SCH: 100 INJECTION, SOLUTION INTRAVENOUS; SUBCUTANEOUS at 16:32

## 2022-09-14 RX ADMIN — INSULIN ASPART SCH: 100 INJECTION, SOLUTION INTRAVENOUS; SUBCUTANEOUS at 21:18

## 2022-09-14 RX ADMIN — GABAPENTIN SCH MG: 100 CAPSULE ORAL at 06:12

## 2022-09-14 RX ADMIN — GABAPENTIN SCH: 100 CAPSULE ORAL at 13:54

## 2022-09-14 RX ADMIN — ASPIRIN 81 MG SCH MG: 81 TABLET ORAL at 10:02

## 2022-09-14 RX ADMIN — TRAZODONE HYDROCHLORIDE SCH MG: 50 TABLET ORAL at 21:17

## 2022-09-14 RX ADMIN — AMLODIPINE BESYLATE SCH MG: 2.5 TABLET ORAL at 10:02

## 2022-09-14 RX ADMIN — Medication SCH MG: at 21:17

## 2022-09-14 RX ADMIN — METFORMIN HYDROCHLORIDE SCH MG: 500 TABLET ORAL at 06:12

## 2022-09-14 RX ADMIN — INSULIN DETEMIR SCH UNIT: 100 INJECTION, SOLUTION SUBCUTANEOUS at 06:39

## 2022-09-14 RX ADMIN — INSULIN ASPART SCH UNITS: 100 INJECTION, SOLUTION INTRAVENOUS; SUBCUTANEOUS at 12:11

## 2022-09-14 RX ADMIN — LISINOPRIL SCH MG: 5 TABLET ORAL at 10:02

## 2022-09-14 RX ADMIN — CITALOPRAM HYDROBROMIDE SCH MG: 20 TABLET ORAL at 10:02

## 2022-09-14 RX ADMIN — Medication PRN MG: at 21:17

## 2022-09-14 RX ADMIN — INSULIN ASPART SCH: 100 INJECTION, SOLUTION INTRAVENOUS; SUBCUTANEOUS at 06:13

## 2022-09-14 RX ADMIN — METFORMIN HYDROCHLORIDE SCH MG: 500 TABLET ORAL at 16:30

## 2022-09-14 RX ADMIN — Medication SCH TAB: at 10:02

## 2022-09-15 VITALS — TEMPERATURE: 97.1 F

## 2022-09-15 RX ADMIN — METFORMIN HYDROCHLORIDE SCH MG: 500 TABLET ORAL at 06:28

## 2022-09-15 RX ADMIN — INSULIN DETEMIR SCH: 100 INJECTION, SOLUTION SUBCUTANEOUS at 06:29

## 2022-09-15 RX ADMIN — GABAPENTIN SCH: 100 CAPSULE ORAL at 13:57

## 2022-09-15 RX ADMIN — Medication SCH TAB: at 10:16

## 2022-09-15 RX ADMIN — LISINOPRIL SCH MG: 5 TABLET ORAL at 10:16

## 2022-09-15 RX ADMIN — INSULIN ASPART SCH: 100 INJECTION, SOLUTION INTRAVENOUS; SUBCUTANEOUS at 23:37

## 2022-09-15 RX ADMIN — GABAPENTIN SCH MG: 100 CAPSULE ORAL at 06:28

## 2022-09-15 RX ADMIN — AMLODIPINE BESYLATE SCH MG: 2.5 TABLET ORAL at 10:16

## 2022-09-15 RX ADMIN — INSULIN ASPART SCH: 100 INJECTION, SOLUTION INTRAVENOUS; SUBCUTANEOUS at 12:29

## 2022-09-15 RX ADMIN — CITALOPRAM HYDROBROMIDE SCH MG: 20 TABLET ORAL at 10:16

## 2022-09-15 RX ADMIN — METFORMIN HYDROCHLORIDE SCH MG: 500 TABLET ORAL at 16:30

## 2022-09-15 RX ADMIN — Medication SCH: at 23:38

## 2022-09-15 RX ADMIN — GABAPENTIN SCH: 100 CAPSULE ORAL at 23:37

## 2022-09-15 RX ADMIN — TRAZODONE HYDROCHLORIDE SCH: 50 TABLET ORAL at 23:37

## 2022-09-15 RX ADMIN — ASPIRIN 81 MG SCH MG: 81 TABLET ORAL at 10:16

## 2022-09-15 RX ADMIN — INSULIN ASPART SCH: 100 INJECTION, SOLUTION INTRAVENOUS; SUBCUTANEOUS at 06:29

## 2022-09-15 RX ADMIN — INSULIN ASPART SCH UNITS: 100 INJECTION, SOLUTION INTRAVENOUS; SUBCUTANEOUS at 16:31

## 2022-09-16 VITALS — DIASTOLIC BLOOD PRESSURE: 82 MMHG | HEART RATE: 75 BPM | SYSTOLIC BLOOD PRESSURE: 145 MMHG

## 2022-09-16 RX ADMIN — ASPIRIN 81 MG SCH MG: 81 TABLET ORAL at 09:44

## 2022-09-16 RX ADMIN — GABAPENTIN SCH MG: 100 CAPSULE ORAL at 06:33

## 2022-09-16 RX ADMIN — INSULIN DETEMIR SCH UNIT: 100 INJECTION, SOLUTION SUBCUTANEOUS at 06:47

## 2022-09-16 RX ADMIN — INSULIN ASPART SCH: 100 INJECTION, SOLUTION INTRAVENOUS; SUBCUTANEOUS at 06:34

## 2022-09-16 RX ADMIN — LISINOPRIL SCH MG: 5 TABLET ORAL at 09:44

## 2022-09-16 RX ADMIN — CITALOPRAM HYDROBROMIDE SCH MG: 20 TABLET ORAL at 09:44

## 2022-09-16 RX ADMIN — Medication SCH TAB: at 09:44

## 2022-09-16 RX ADMIN — AMLODIPINE BESYLATE SCH MG: 2.5 TABLET ORAL at 09:44

## 2022-09-16 RX ADMIN — INSULIN DETEMIR SCH: 100 INJECTION, SOLUTION SUBCUTANEOUS at 07:59

## 2022-09-16 RX ADMIN — METFORMIN HYDROCHLORIDE SCH MG: 500 TABLET ORAL at 06:33

## 2023-04-17 ENCOUNTER — HOSPITAL ENCOUNTER (INPATIENT)
Dept: HOSPITAL 74 - YASAS | Age: 48
LOS: 4 days | Discharge: OTHER INSTITUTION | End: 2023-04-21
Attending: SURGERY | Admitting: ALLERGY & IMMUNOLOGY
Payer: COMMERCIAL

## 2023-04-17 VITALS — BODY MASS INDEX: 45.6 KG/M2

## 2023-04-17 DIAGNOSIS — J45.909: ICD-10-CM

## 2023-04-17 DIAGNOSIS — E11.9: ICD-10-CM

## 2023-04-17 DIAGNOSIS — F14.20: ICD-10-CM

## 2023-04-17 DIAGNOSIS — I10: ICD-10-CM

## 2023-04-17 DIAGNOSIS — Z56.0: ICD-10-CM

## 2023-04-17 DIAGNOSIS — Z79.84: ICD-10-CM

## 2023-04-17 DIAGNOSIS — R60.0: ICD-10-CM

## 2023-04-17 DIAGNOSIS — E66.01: ICD-10-CM

## 2023-04-17 DIAGNOSIS — F19.282: ICD-10-CM

## 2023-04-17 DIAGNOSIS — F10.230: Primary | ICD-10-CM

## 2023-04-17 DIAGNOSIS — F17.210: ICD-10-CM

## 2023-04-17 DIAGNOSIS — Z59.00: ICD-10-CM

## 2023-04-17 LAB
ALBUMIN SERPL-MCNC: 3.7 G/DL (ref 3.4–5)
ALP SERPL-CCNC: 83 U/L (ref 45–117)
ALT SERPL-CCNC: 19 U/L (ref 13–61)
ANION GAP SERPL CALC-SCNC: 7 MMOL/L (ref 8–16)
AST SERPL-CCNC: 20 U/L (ref 15–37)
BILIRUB SERPL-MCNC: 0.6 MG/DL (ref 0.2–1)
BUN SERPL-MCNC: 22.2 MG/DL (ref 7–18)
CALCIUM SERPL-MCNC: 9.1 MG/DL (ref 8.5–10.1)
CHLORIDE SERPL-SCNC: 100 MMOL/L (ref 98–107)
CO2 SERPL-SCNC: 28 MMOL/L (ref 21–32)
CREAT SERPL-MCNC: 1.2 MG/DL (ref 0.55–1.3)
DEPRECATED RDW RBC AUTO: 14.3 % (ref 11.9–15.9)
GLUCOSE SERPL-MCNC: 280 MG/DL (ref 74–106)
HCT VFR BLD CALC: 40.6 % (ref 35.4–49)
HGB BLD-MCNC: 14 GM/DL (ref 11.7–16.9)
MCH RBC QN AUTO: 29.3 PG (ref 25.7–33.7)
MCHC RBC AUTO-ENTMCNC: 34.4 G/DL (ref 32–35.9)
MCV RBC: 85.2 FL (ref 80–96)
PLATELET # BLD AUTO: 250 10^3/UL (ref 134–434)
PMV BLD: 8.4 FL (ref 7.5–11.1)
PROT SERPL-MCNC: 7.9 G/DL (ref 6.4–8.2)
RBC # BLD AUTO: 4.77 M/MM3 (ref 4–5.6)
SODIUM SERPL-SCNC: 135 MMOL/L (ref 136–145)
WBC # BLD AUTO: 6.8 K/MM3 (ref 4–10)

## 2023-04-17 PROCEDURE — HZ2ZZZZ DETOXIFICATION SERVICES FOR SUBSTANCE ABUSE TREATMENT: ICD-10-PCS | Performed by: SURGERY

## 2023-04-17 PROCEDURE — U0005 INFEC AGEN DETEC AMPLI PROBE: HCPCS

## 2023-04-17 PROCEDURE — U0003 INFECTIOUS AGENT DETECTION BY NUCLEIC ACID (DNA OR RNA); SEVERE ACUTE RESPIRATORY SYNDROME CORONAVIRUS 2 (SARS-COV-2) (CORONAVIRUS DISEASE [COVID-19]), AMPLIFIED PROBE TECHNIQUE, MAKING USE OF HIGH THROUGHPUT TECHNOLOGIES AS DESCRIBED BY CMS-2020-01-R: HCPCS

## 2023-04-17 RX ADMIN — Medication SCH MG: at 22:11

## 2023-04-17 RX ADMIN — METFORMIN HYDROCHLORIDE SCH MG: 500 TABLET ORAL at 17:26

## 2023-04-17 RX ADMIN — AMLODIPINE BESYLATE SCH MG: 2.5 TABLET ORAL at 15:50

## 2023-04-17 RX ADMIN — TRAZODONE HYDROCHLORIDE SCH MG: 50 TABLET ORAL at 22:11

## 2023-04-17 RX ADMIN — INSULIN ASPART SCH UNITS: 100 INJECTION, SOLUTION INTRAVENOUS; SUBCUTANEOUS at 17:29

## 2023-04-17 RX ADMIN — INSULIN ASPART SCH UNITS: 100 INJECTION, SOLUTION INTRAVENOUS; SUBCUTANEOUS at 22:15

## 2023-04-17 RX ADMIN — LISINOPRIL SCH MG: 5 TABLET ORAL at 15:50

## 2023-04-17 RX ADMIN — ATORVASTATIN CALCIUM SCH MG: 20 TABLET, FILM COATED ORAL at 22:11

## 2023-04-17 SDOH — ECONOMIC STABILITY - HOUSING INSECURITY: HOMELESSNESS UNSPECIFIED: Z59.00

## 2023-04-17 SDOH — ECONOMIC STABILITY - INCOME SECURITY: UNEMPLOYMENT, UNSPECIFIED: Z56.0

## 2023-04-18 RX ADMIN — INSULIN ASPART SCH UNITS: 100 INJECTION, SOLUTION INTRAVENOUS; SUBCUTANEOUS at 06:18

## 2023-04-18 RX ADMIN — INSULIN ASPART SCH UNITS: 100 INJECTION, SOLUTION INTRAVENOUS; SUBCUTANEOUS at 11:55

## 2023-04-18 RX ADMIN — INSULIN ASPART SCH UNITS: 100 INJECTION, SOLUTION INTRAVENOUS; SUBCUTANEOUS at 22:02

## 2023-04-18 RX ADMIN — CITALOPRAM HYDROBROMIDE SCH MG: 20 TABLET ORAL at 10:47

## 2023-04-18 RX ADMIN — METFORMIN HYDROCHLORIDE SCH MG: 500 TABLET ORAL at 17:20

## 2023-04-18 RX ADMIN — TRAZODONE HYDROCHLORIDE SCH MG: 50 TABLET ORAL at 22:02

## 2023-04-18 RX ADMIN — AMLODIPINE BESYLATE SCH MG: 2.5 TABLET ORAL at 10:47

## 2023-04-18 RX ADMIN — Medication SCH MG: at 22:02

## 2023-04-18 RX ADMIN — INSULIN ASPART SCH UNITS: 100 INJECTION, SOLUTION INTRAVENOUS; SUBCUTANEOUS at 17:21

## 2023-04-18 RX ADMIN — ATORVASTATIN CALCIUM SCH MG: 20 TABLET, FILM COATED ORAL at 22:02

## 2023-04-18 RX ADMIN — Medication SCH: at 10:53

## 2023-04-18 RX ADMIN — METFORMIN HYDROCHLORIDE SCH MG: 500 TABLET ORAL at 06:18

## 2023-04-18 RX ADMIN — LISINOPRIL SCH MG: 5 TABLET ORAL at 10:46

## 2023-04-18 RX ADMIN — ASPIRIN 81 MG SCH MG: 81 TABLET ORAL at 10:47

## 2023-04-19 RX ADMIN — CITALOPRAM HYDROBROMIDE SCH MG: 20 TABLET ORAL at 11:15

## 2023-04-19 RX ADMIN — ASPIRIN 81 MG SCH MG: 81 TABLET ORAL at 11:15

## 2023-04-19 RX ADMIN — METFORMIN HYDROCHLORIDE SCH MG: 500 TABLET ORAL at 06:10

## 2023-04-19 RX ADMIN — Medication SCH MG: at 21:59

## 2023-04-19 RX ADMIN — INSULIN ASPART SCH UNITS: 100 INJECTION, SOLUTION INTRAVENOUS; SUBCUTANEOUS at 11:23

## 2023-04-19 RX ADMIN — Medication SCH TAB: at 11:17

## 2023-04-19 RX ADMIN — INSULIN ASPART SCH UNITS: 100 INJECTION, SOLUTION INTRAVENOUS; SUBCUTANEOUS at 16:49

## 2023-04-19 RX ADMIN — TRAZODONE HYDROCHLORIDE SCH MG: 50 TABLET ORAL at 21:59

## 2023-04-19 RX ADMIN — METFORMIN HYDROCHLORIDE SCH MG: 500 TABLET ORAL at 16:49

## 2023-04-19 RX ADMIN — INSULIN ASPART SCH UNITS: 100 INJECTION, SOLUTION INTRAVENOUS; SUBCUTANEOUS at 21:58

## 2023-04-19 RX ADMIN — LISINOPRIL SCH MG: 5 TABLET ORAL at 11:15

## 2023-04-19 RX ADMIN — ATORVASTATIN CALCIUM SCH MG: 20 TABLET, FILM COATED ORAL at 21:59

## 2023-04-19 RX ADMIN — AMLODIPINE BESYLATE SCH MG: 2.5 TABLET ORAL at 11:15

## 2023-04-19 RX ADMIN — INSULIN ASPART SCH UNITS: 100 INJECTION, SOLUTION INTRAVENOUS; SUBCUTANEOUS at 07:12

## 2023-04-20 RX ADMIN — INSULIN ASPART SCH UNITS: 100 INJECTION, SOLUTION INTRAVENOUS; SUBCUTANEOUS at 16:57

## 2023-04-20 RX ADMIN — INSULIN ASPART SCH UNITS: 100 INJECTION, SOLUTION INTRAVENOUS; SUBCUTANEOUS at 06:21

## 2023-04-20 RX ADMIN — Medication SCH TAB: at 10:50

## 2023-04-20 RX ADMIN — INSULIN ASPART SCH UNITS: 100 INJECTION, SOLUTION INTRAVENOUS; SUBCUTANEOUS at 11:36

## 2023-04-20 RX ADMIN — Medication SCH MG: at 22:07

## 2023-04-20 RX ADMIN — AMLODIPINE BESYLATE SCH MG: 2.5 TABLET ORAL at 10:51

## 2023-04-20 RX ADMIN — TRAZODONE HYDROCHLORIDE SCH MG: 50 TABLET ORAL at 22:06

## 2023-04-20 RX ADMIN — ASPIRIN 81 MG SCH MG: 81 TABLET ORAL at 10:51

## 2023-04-20 RX ADMIN — INSULIN ASPART SCH UNITS: 100 INJECTION, SOLUTION INTRAVENOUS; SUBCUTANEOUS at 22:07

## 2023-04-20 RX ADMIN — METFORMIN HYDROCHLORIDE SCH MG: 500 TABLET ORAL at 16:56

## 2023-04-20 RX ADMIN — ATORVASTATIN CALCIUM SCH MG: 20 TABLET, FILM COATED ORAL at 22:02

## 2023-04-20 RX ADMIN — CITALOPRAM HYDROBROMIDE SCH MG: 20 TABLET ORAL at 10:50

## 2023-04-20 RX ADMIN — Medication SCH MG: at 22:01

## 2023-04-20 RX ADMIN — LISINOPRIL SCH MG: 5 TABLET ORAL at 10:50

## 2023-04-20 RX ADMIN — METFORMIN HYDROCHLORIDE SCH MG: 500 TABLET ORAL at 06:17

## 2023-04-21 ENCOUNTER — HOSPITAL ENCOUNTER (INPATIENT)
Dept: HOSPITAL 74 - YASAS | Age: 48
LOS: 4 days | Discharge: HOME | DRG: 772 | End: 2023-04-25
Attending: PSYCHIATRY & NEUROLOGY | Admitting: ALLERGY & IMMUNOLOGY
Payer: COMMERCIAL

## 2023-04-21 VITALS — SYSTOLIC BLOOD PRESSURE: 132 MMHG | HEART RATE: 75 BPM | TEMPERATURE: 97.7 F | DIASTOLIC BLOOD PRESSURE: 68 MMHG

## 2023-04-21 VITALS — RESPIRATION RATE: 18 BRPM

## 2023-04-21 DIAGNOSIS — E66.01: ICD-10-CM

## 2023-04-21 DIAGNOSIS — I83.93: ICD-10-CM

## 2023-04-21 DIAGNOSIS — I10: ICD-10-CM

## 2023-04-21 DIAGNOSIS — E11.65: ICD-10-CM

## 2023-04-21 DIAGNOSIS — F10.20: Primary | ICD-10-CM

## 2023-04-21 DIAGNOSIS — F31.9: ICD-10-CM

## 2023-04-21 DIAGNOSIS — Z79.84: ICD-10-CM

## 2023-04-21 DIAGNOSIS — F17.210: ICD-10-CM

## 2023-04-21 DIAGNOSIS — F19.24: ICD-10-CM

## 2023-04-21 DIAGNOSIS — F19.282: ICD-10-CM

## 2023-04-21 DIAGNOSIS — J45.20: ICD-10-CM

## 2023-04-21 DIAGNOSIS — F14.20: ICD-10-CM

## 2023-04-21 DIAGNOSIS — R76.11: ICD-10-CM

## 2023-04-21 PROCEDURE — HZ42ZZZ GROUP COUNSELING FOR SUBSTANCE ABUSE TREATMENT, COGNITIVE-BEHAVIORAL: ICD-10-PCS | Performed by: PSYCHIATRY & NEUROLOGY

## 2023-04-21 RX ADMIN — Medication SCH TAB: at 10:23

## 2023-04-21 RX ADMIN — Medication SCH MG: at 21:16

## 2023-04-21 RX ADMIN — TRAZODONE HYDROCHLORIDE SCH MG: 50 TABLET ORAL at 21:16

## 2023-04-21 RX ADMIN — ASPIRIN 81 MG SCH MG: 81 TABLET ORAL at 10:23

## 2023-04-21 RX ADMIN — INSULIN ASPART SCH UNITS: 100 INJECTION, SOLUTION INTRAVENOUS; SUBCUTANEOUS at 21:15

## 2023-04-21 RX ADMIN — INSULIN ASPART SCH UNITS: 100 INJECTION, SOLUTION INTRAVENOUS; SUBCUTANEOUS at 17:24

## 2023-04-21 RX ADMIN — INSULIN ASPART SCH: 100 INJECTION, SOLUTION INTRAVENOUS; SUBCUTANEOUS at 07:46

## 2023-04-21 RX ADMIN — METFORMIN HYDROCHLORIDE SCH MG: 500 TABLET ORAL at 05:59

## 2023-04-21 RX ADMIN — CITALOPRAM HYDROBROMIDE SCH MG: 20 TABLET ORAL at 10:23

## 2023-04-21 RX ADMIN — LISINOPRIL SCH MG: 5 TABLET ORAL at 10:23

## 2023-04-21 RX ADMIN — METFORMIN HYDROCHLORIDE SCH MG: 500 TABLET ORAL at 17:24

## 2023-04-21 RX ADMIN — INSULIN ASPART SCH UNITS: 100 INJECTION, SOLUTION INTRAVENOUS; SUBCUTANEOUS at 11:41

## 2023-04-21 RX ADMIN — AMLODIPINE BESYLATE SCH MG: 2.5 TABLET ORAL at 10:23

## 2023-04-22 RX ADMIN — INSULIN ASPART SCH UNITS: 100 INJECTION, SOLUTION INTRAVENOUS; SUBCUTANEOUS at 21:21

## 2023-04-22 RX ADMIN — Medication SCH MG: at 21:21

## 2023-04-22 RX ADMIN — INSULIN ASPART SCH UNITS: 100 INJECTION, SOLUTION INTRAVENOUS; SUBCUTANEOUS at 17:28

## 2023-04-22 RX ADMIN — Medication SCH: at 10:14

## 2023-04-22 RX ADMIN — LISINOPRIL SCH MG: 5 TABLET ORAL at 10:13

## 2023-04-22 RX ADMIN — AMLODIPINE BESYLATE SCH MG: 2.5 TABLET ORAL at 10:13

## 2023-04-22 RX ADMIN — INSULIN ASPART SCH UNITS: 100 INJECTION, SOLUTION INTRAVENOUS; SUBCUTANEOUS at 11:08

## 2023-04-22 RX ADMIN — METFORMIN HYDROCHLORIDE SCH MG: 500 TABLET ORAL at 06:24

## 2023-04-22 RX ADMIN — ASPIRIN 81 MG SCH MG: 81 TABLET ORAL at 10:13

## 2023-04-22 RX ADMIN — CITALOPRAM HYDROBROMIDE SCH MG: 20 TABLET ORAL at 10:14

## 2023-04-22 RX ADMIN — NICOTINE SCH: 7 PATCH TRANSDERMAL at 10:14

## 2023-04-22 RX ADMIN — Medication SCH MG: at 21:22

## 2023-04-22 RX ADMIN — METFORMIN HYDROCHLORIDE SCH MG: 500 TABLET ORAL at 17:27

## 2023-04-22 RX ADMIN — INSULIN ASPART SCH UNITS: 100 INJECTION, SOLUTION INTRAVENOUS; SUBCUTANEOUS at 06:26

## 2023-04-22 RX ADMIN — TRAZODONE HYDROCHLORIDE SCH MG: 50 TABLET ORAL at 21:21

## 2023-04-23 RX ADMIN — CITALOPRAM HYDROBROMIDE SCH MG: 20 TABLET ORAL at 09:48

## 2023-04-23 RX ADMIN — INSULIN ASPART SCH: 100 INJECTION, SOLUTION INTRAVENOUS; SUBCUTANEOUS at 06:50

## 2023-04-23 RX ADMIN — NICOTINE SCH: 7 PATCH TRANSDERMAL at 09:49

## 2023-04-23 RX ADMIN — Medication SCH TAB: at 09:48

## 2023-04-23 RX ADMIN — METFORMIN HYDROCHLORIDE SCH MG: 500 TABLET ORAL at 17:06

## 2023-04-23 RX ADMIN — Medication SCH MG: at 21:17

## 2023-04-23 RX ADMIN — INSULIN ASPART SCH UNITS: 100 INJECTION, SOLUTION INTRAVENOUS; SUBCUTANEOUS at 21:17

## 2023-04-23 RX ADMIN — INSULIN ASPART SCH UNITS: 100 INJECTION, SOLUTION INTRAVENOUS; SUBCUTANEOUS at 11:17

## 2023-04-23 RX ADMIN — AMLODIPINE BESYLATE SCH MG: 2.5 TABLET ORAL at 09:49

## 2023-04-23 RX ADMIN — ASPIRIN 81 MG SCH MG: 81 TABLET ORAL at 09:48

## 2023-04-23 RX ADMIN — INSULIN ASPART SCH UNITS: 100 INJECTION, SOLUTION INTRAVENOUS; SUBCUTANEOUS at 17:07

## 2023-04-23 RX ADMIN — TRAZODONE HYDROCHLORIDE SCH MG: 50 TABLET ORAL at 21:17

## 2023-04-23 RX ADMIN — LISINOPRIL SCH MG: 5 TABLET ORAL at 09:48

## 2023-04-23 RX ADMIN — METFORMIN HYDROCHLORIDE SCH MG: 500 TABLET ORAL at 06:49

## 2023-04-24 RX ADMIN — INSULIN ASPART SCH UNITS: 100 INJECTION, SOLUTION INTRAVENOUS; SUBCUTANEOUS at 21:14

## 2023-04-24 RX ADMIN — AMLODIPINE BESYLATE SCH MG: 2.5 TABLET ORAL at 09:43

## 2023-04-24 RX ADMIN — CITALOPRAM HYDROBROMIDE SCH MG: 20 TABLET ORAL at 09:42

## 2023-04-24 RX ADMIN — INSULIN ASPART SCH: 100 INJECTION, SOLUTION INTRAVENOUS; SUBCUTANEOUS at 06:23

## 2023-04-24 RX ADMIN — NICOTINE SCH: 7 PATCH TRANSDERMAL at 09:43

## 2023-04-24 RX ADMIN — ASPIRIN 81 MG SCH MG: 81 TABLET ORAL at 09:42

## 2023-04-24 RX ADMIN — TRAZODONE HYDROCHLORIDE SCH MG: 50 TABLET ORAL at 21:13

## 2023-04-24 RX ADMIN — Medication SCH MG: at 21:13

## 2023-04-24 RX ADMIN — Medication SCH TAB: at 09:41

## 2023-04-24 RX ADMIN — INSULIN ASPART SCH UNITS: 100 INJECTION, SOLUTION INTRAVENOUS; SUBCUTANEOUS at 11:17

## 2023-04-24 RX ADMIN — METFORMIN HYDROCHLORIDE SCH MG: 500 TABLET ORAL at 06:21

## 2023-04-24 RX ADMIN — METFORMIN HYDROCHLORIDE SCH MG: 500 TABLET ORAL at 16:52

## 2023-04-24 RX ADMIN — INSULIN ASPART SCH UNITS: 100 INJECTION, SOLUTION INTRAVENOUS; SUBCUTANEOUS at 16:55

## 2023-04-24 RX ADMIN — Medication SCH MG: at 21:14

## 2023-04-24 RX ADMIN — LISINOPRIL SCH MG: 5 TABLET ORAL at 09:42

## 2023-04-25 VITALS — HEART RATE: 76 BPM | SYSTOLIC BLOOD PRESSURE: 132 MMHG | DIASTOLIC BLOOD PRESSURE: 71 MMHG

## 2023-04-25 VITALS — TEMPERATURE: 97.1 F

## 2023-04-25 RX ADMIN — Medication SCH TAB: at 10:02

## 2023-04-25 RX ADMIN — AMLODIPINE BESYLATE SCH MG: 2.5 TABLET ORAL at 10:03

## 2023-04-25 RX ADMIN — CITALOPRAM HYDROBROMIDE SCH MG: 20 TABLET ORAL at 10:02

## 2023-04-25 RX ADMIN — NICOTINE SCH: 7 PATCH TRANSDERMAL at 10:03

## 2023-04-25 RX ADMIN — INSULIN ASPART SCH UNITS: 100 INJECTION, SOLUTION INTRAVENOUS; SUBCUTANEOUS at 11:59

## 2023-04-25 RX ADMIN — ASPIRIN 81 MG SCH MG: 81 TABLET ORAL at 10:02

## 2023-04-25 RX ADMIN — INSULIN ASPART SCH UNITS: 100 INJECTION, SOLUTION INTRAVENOUS; SUBCUTANEOUS at 08:45

## 2023-04-25 RX ADMIN — METFORMIN HYDROCHLORIDE SCH MG: 500 TABLET ORAL at 06:02

## 2023-04-25 RX ADMIN — LISINOPRIL SCH MG: 5 TABLET ORAL at 10:02

## 2023-08-10 ENCOUNTER — HOSPITAL ENCOUNTER (INPATIENT)
Dept: HOSPITAL 74 - YASAS | Age: 48
LOS: 5 days | Discharge: HOME | DRG: 773 | End: 2023-08-15
Attending: ALLERGY & IMMUNOLOGY | Admitting: ALLERGY & IMMUNOLOGY
Payer: COMMERCIAL

## 2023-08-10 VITALS — BODY MASS INDEX: 43.2 KG/M2

## 2023-08-10 DIAGNOSIS — Z59.00: ICD-10-CM

## 2023-08-10 DIAGNOSIS — Z86.11: ICD-10-CM

## 2023-08-10 DIAGNOSIS — Z91.51: ICD-10-CM

## 2023-08-10 DIAGNOSIS — J45.909: ICD-10-CM

## 2023-08-10 DIAGNOSIS — M54.50: ICD-10-CM

## 2023-08-10 DIAGNOSIS — E11.9: ICD-10-CM

## 2023-08-10 DIAGNOSIS — E66.01: ICD-10-CM

## 2023-08-10 DIAGNOSIS — E78.5: ICD-10-CM

## 2023-08-10 DIAGNOSIS — Z56.0: ICD-10-CM

## 2023-08-10 DIAGNOSIS — I10: ICD-10-CM

## 2023-08-10 DIAGNOSIS — F19.94: ICD-10-CM

## 2023-08-10 DIAGNOSIS — F20.9: ICD-10-CM

## 2023-08-10 DIAGNOSIS — F31.81: ICD-10-CM

## 2023-08-10 DIAGNOSIS — F14.20: ICD-10-CM

## 2023-08-10 DIAGNOSIS — F33.1: ICD-10-CM

## 2023-08-10 DIAGNOSIS — Z79.4: ICD-10-CM

## 2023-08-10 DIAGNOSIS — F11.23: Primary | ICD-10-CM

## 2023-08-10 DIAGNOSIS — F19.982: ICD-10-CM

## 2023-08-10 DIAGNOSIS — F17.213: ICD-10-CM

## 2023-08-10 PROCEDURE — HZ2ZZZZ DETOXIFICATION SERVICES FOR SUBSTANCE ABUSE TREATMENT: ICD-10-PCS | Performed by: ALLERGY & IMMUNOLOGY

## 2023-08-10 RX ADMIN — INSULIN ASPART SCH UNITS: 100 INJECTION, SOLUTION INTRAVENOUS; SUBCUTANEOUS at 16:46

## 2023-08-10 RX ADMIN — TRAZODONE HYDROCHLORIDE SCH: 50 TABLET ORAL at 23:41

## 2023-08-10 RX ADMIN — Medication SCH: at 23:41

## 2023-08-10 SDOH — ECONOMIC STABILITY - HOUSING INSECURITY: HOMELESSNESS UNSPECIFIED: Z59.00

## 2023-08-10 SDOH — ECONOMIC STABILITY - INCOME SECURITY: UNEMPLOYMENT, UNSPECIFIED: Z56.0

## 2023-08-11 LAB
ALBUMIN SERPL-MCNC: 3.3 G/DL (ref 3.4–5)
ALP SERPL-CCNC: 88 U/L (ref 45–117)
ALT SERPL-CCNC: 16 U/L (ref 13–61)
ANION GAP SERPL CALC-SCNC: 7 MMOL/L (ref 8–16)
AST SERPL-CCNC: 19 U/L (ref 15–37)
BILIRUB SERPL-MCNC: 0.2 MG/DL (ref 0.2–1)
BUN SERPL-MCNC: 11.8 MG/DL (ref 7–18)
CALCIUM SERPL-MCNC: 8.7 MG/DL (ref 8.5–10.1)
CHLORIDE SERPL-SCNC: 103 MMOL/L (ref 98–107)
CO2 SERPL-SCNC: 29 MMOL/L (ref 21–32)
CREAT SERPL-MCNC: 1 MG/DL (ref 0.55–1.3)
DEPRECATED RDW RBC AUTO: 14.9 % (ref 11.9–15.9)
GLUCOSE SERPL-MCNC: 316 MG/DL (ref 74–106)
HCT VFR BLD CALC: 40.7 % (ref 35.4–49)
HGB BLD-MCNC: 13.4 GM/DL (ref 11.7–16.9)
MCH RBC QN AUTO: 29.7 PG (ref 25.7–33.7)
MCHC RBC AUTO-ENTMCNC: 32.9 G/DL (ref 32–35.9)
MCV RBC: 90.3 FL (ref 80–96)
PLATELET # BLD AUTO: 219 10^3/UL (ref 134–434)
PMV BLD: 9 FL (ref 7.5–11.1)
POTASSIUM SERPLBLD-SCNC: 4.7 MMOL/L (ref 3.5–5.1)
PROT SERPL-MCNC: 7.3 G/DL (ref 6.4–8.2)
RBC # BLD AUTO: 4.5 M/MM3 (ref 4–5.6)
SODIUM SERPL-SCNC: 139 MMOL/L (ref 136–145)
WBC # BLD AUTO: 3.8 K/MM3 (ref 4–10)

## 2023-08-11 RX ADMIN — METFORMIN HYDROCHLORIDE SCH MG: 500 TABLET ORAL at 16:55

## 2023-08-11 RX ADMIN — INSULIN ASPART SCH UNITS: 100 INJECTION, SOLUTION INTRAVENOUS; SUBCUTANEOUS at 22:50

## 2023-08-11 RX ADMIN — METHOCARBAMOL PRN MG: 500 TABLET ORAL at 10:53

## 2023-08-11 RX ADMIN — Medication SCH MG: at 22:59

## 2023-08-11 RX ADMIN — INSULIN ASPART SCH: 100 INJECTION, SOLUTION INTRAVENOUS; SUBCUTANEOUS at 06:24

## 2023-08-11 RX ADMIN — TRAZODONE HYDROCHLORIDE SCH MG: 50 TABLET ORAL at 22:59

## 2023-08-11 RX ADMIN — CITALOPRAM HYDROBROMIDE SCH MG: 20 TABLET ORAL at 10:53

## 2023-08-11 RX ADMIN — INSULIN ASPART SCH UNITS: 100 INJECTION, SOLUTION INTRAVENOUS; SUBCUTANEOUS at 16:55

## 2023-08-11 RX ADMIN — INSULIN ASPART SCH: 100 INJECTION, SOLUTION INTRAVENOUS; SUBCUTANEOUS at 18:16

## 2023-08-11 RX ADMIN — Medication SCH TAB: at 10:53

## 2023-08-11 RX ADMIN — NICOTINE SCH MG: 14 PATCH, EXTENDED RELEASE TRANSDERMAL at 10:54

## 2023-08-11 RX ADMIN — HYDROXYZINE PAMOATE PRN MG: 25 CAPSULE ORAL at 10:53

## 2023-08-11 RX ADMIN — LISINOPRIL SCH MG: 20 TABLET ORAL at 10:53

## 2023-08-12 RX ADMIN — TRAZODONE HYDROCHLORIDE SCH MG: 50 TABLET ORAL at 23:07

## 2023-08-12 RX ADMIN — ASPIRIN 81 MG SCH MG: 81 TABLET ORAL at 10:55

## 2023-08-12 RX ADMIN — INSULIN ASPART SCH UNITS: 100 INJECTION, SOLUTION INTRAVENOUS; SUBCUTANEOUS at 11:29

## 2023-08-12 RX ADMIN — INSULIN ASPART SCH UNITS: 100 INJECTION, SOLUTION INTRAVENOUS; SUBCUTANEOUS at 17:33

## 2023-08-12 RX ADMIN — Medication SCH: at 23:43

## 2023-08-12 RX ADMIN — INSULIN ASPART SCH UNITS: 100 INJECTION, SOLUTION INTRAVENOUS; SUBCUTANEOUS at 23:08

## 2023-08-12 RX ADMIN — LISINOPRIL SCH MG: 20 TABLET ORAL at 10:58

## 2023-08-12 RX ADMIN — CITALOPRAM HYDROBROMIDE SCH MG: 20 TABLET ORAL at 10:58

## 2023-08-12 RX ADMIN — INSULIN ASPART SCH: 100 INJECTION, SOLUTION INTRAVENOUS; SUBCUTANEOUS at 06:10

## 2023-08-12 RX ADMIN — NICOTINE SCH MG: 14 PATCH, EXTENDED RELEASE TRANSDERMAL at 10:56

## 2023-08-12 RX ADMIN — METFORMIN HYDROCHLORIDE SCH MG: 500 TABLET ORAL at 17:32

## 2023-08-12 RX ADMIN — Medication SCH MG: at 23:07

## 2023-08-12 RX ADMIN — Medication SCH TAB: at 10:55

## 2023-08-12 RX ADMIN — INSULIN ASPART SCH UNITS: 100 INJECTION, SOLUTION INTRAVENOUS; SUBCUTANEOUS at 17:32

## 2023-08-12 RX ADMIN — INSULIN ASPART SCH: 100 INJECTION, SOLUTION INTRAVENOUS; SUBCUTANEOUS at 06:57

## 2023-08-12 RX ADMIN — METFORMIN HYDROCHLORIDE SCH MG: 500 TABLET ORAL at 06:07

## 2023-08-13 RX ADMIN — Medication SCH TAB: at 10:45

## 2023-08-13 RX ADMIN — NICOTINE SCH MG: 14 PATCH, EXTENDED RELEASE TRANSDERMAL at 10:47

## 2023-08-13 RX ADMIN — Medication SCH MG: at 22:27

## 2023-08-13 RX ADMIN — INSULIN ASPART SCH: 100 INJECTION, SOLUTION INTRAVENOUS; SUBCUTANEOUS at 17:32

## 2023-08-13 RX ADMIN — Medication SCH: at 22:30

## 2023-08-13 RX ADMIN — METFORMIN HYDROCHLORIDE SCH MG: 500 TABLET ORAL at 06:11

## 2023-08-13 RX ADMIN — NICOTINE SCH: 14 PATCH, EXTENDED RELEASE TRANSDERMAL at 10:30

## 2023-08-13 RX ADMIN — INSULIN ASPART SCH: 100 INJECTION, SOLUTION INTRAVENOUS; SUBCUTANEOUS at 07:17

## 2023-08-13 RX ADMIN — INSULIN ASPART SCH UNITS: 100 INJECTION, SOLUTION INTRAVENOUS; SUBCUTANEOUS at 22:22

## 2023-08-13 RX ADMIN — HYDROXYZINE PAMOATE PRN MG: 25 CAPSULE ORAL at 10:46

## 2023-08-13 RX ADMIN — LISINOPRIL SCH MG: 20 TABLET ORAL at 10:46

## 2023-08-13 RX ADMIN — INSULIN ASPART SCH UNITS: 100 INJECTION, SOLUTION INTRAVENOUS; SUBCUTANEOUS at 11:07

## 2023-08-13 RX ADMIN — METHOCARBAMOL PRN MG: 500 TABLET ORAL at 10:46

## 2023-08-13 RX ADMIN — INSULIN DETEMIR SCH UNITS: 100 INJECTION, SOLUTION SUBCUTANEOUS at 22:29

## 2023-08-13 RX ADMIN — ASPIRIN 81 MG SCH MG: 81 TABLET ORAL at 10:46

## 2023-08-13 RX ADMIN — METFORMIN HYDROCHLORIDE SCH MG: 500 TABLET ORAL at 17:28

## 2023-08-13 RX ADMIN — CITALOPRAM HYDROBROMIDE SCH MG: 20 TABLET ORAL at 10:46

## 2023-08-13 RX ADMIN — TRAZODONE HYDROCHLORIDE SCH MG: 50 TABLET ORAL at 22:27

## 2023-08-14 RX ADMIN — Medication SCH TAB: at 10:49

## 2023-08-14 RX ADMIN — ASPIRIN 81 MG SCH MG: 81 TABLET ORAL at 10:49

## 2023-08-14 RX ADMIN — INSULIN ASPART SCH: 100 INJECTION, SOLUTION INTRAVENOUS; SUBCUTANEOUS at 06:13

## 2023-08-14 RX ADMIN — LISINOPRIL SCH MG: 20 TABLET ORAL at 10:50

## 2023-08-14 RX ADMIN — NICOTINE SCH: 14 PATCH, EXTENDED RELEASE TRANSDERMAL at 10:50

## 2023-08-14 RX ADMIN — METHOCARBAMOL PRN MG: 500 TABLET ORAL at 10:49

## 2023-08-14 RX ADMIN — INSULIN ASPART SCH UNITS: 100 INJECTION, SOLUTION INTRAVENOUS; SUBCUTANEOUS at 16:36

## 2023-08-14 RX ADMIN — INSULIN DETEMIR SCH UNITS: 100 INJECTION, SOLUTION SUBCUTANEOUS at 22:00

## 2023-08-14 RX ADMIN — Medication SCH: at 22:22

## 2023-08-14 RX ADMIN — HYDROXYZINE PAMOATE PRN MG: 25 CAPSULE ORAL at 10:49

## 2023-08-14 RX ADMIN — Medication SCH MG: at 22:01

## 2023-08-14 RX ADMIN — METFORMIN HYDROCHLORIDE SCH MG: 500 TABLET ORAL at 16:53

## 2023-08-14 RX ADMIN — METFORMIN HYDROCHLORIDE SCH MG: 500 TABLET ORAL at 06:12

## 2023-08-14 RX ADMIN — TRAZODONE HYDROCHLORIDE SCH MG: 50 TABLET ORAL at 22:01

## 2023-08-14 RX ADMIN — INSULIN ASPART SCH UNITS: 100 INJECTION, SOLUTION INTRAVENOUS; SUBCUTANEOUS at 21:56

## 2023-08-14 RX ADMIN — CITALOPRAM HYDROBROMIDE SCH MG: 20 TABLET ORAL at 10:50

## 2023-08-14 RX ADMIN — INSULIN ASPART SCH UNITS: 100 INJECTION, SOLUTION INTRAVENOUS; SUBCUTANEOUS at 11:48

## 2023-08-15 VITALS
HEART RATE: 74 BPM | TEMPERATURE: 97.4 F | SYSTOLIC BLOOD PRESSURE: 120 MMHG | RESPIRATION RATE: 17 BRPM | DIASTOLIC BLOOD PRESSURE: 70 MMHG

## 2023-08-15 RX ADMIN — LISINOPRIL SCH: 20 TABLET ORAL at 10:36

## 2023-08-15 RX ADMIN — Medication SCH: at 10:36

## 2023-08-15 RX ADMIN — INSULIN ASPART SCH: 100 INJECTION, SOLUTION INTRAVENOUS; SUBCUTANEOUS at 06:01

## 2023-08-15 RX ADMIN — INSULIN ASPART SCH: 100 INJECTION, SOLUTION INTRAVENOUS; SUBCUTANEOUS at 10:36

## 2023-08-15 RX ADMIN — METFORMIN HYDROCHLORIDE SCH MG: 500 TABLET ORAL at 06:01

## 2023-08-15 RX ADMIN — ASPIRIN 81 MG SCH: 81 TABLET ORAL at 10:35

## 2023-08-15 RX ADMIN — NICOTINE SCH: 14 PATCH, EXTENDED RELEASE TRANSDERMAL at 10:35

## 2023-08-15 RX ADMIN — CITALOPRAM HYDROBROMIDE SCH: 20 TABLET ORAL at 10:35

## 2025-03-10 ENCOUNTER — HOSPITAL ENCOUNTER (INPATIENT)
Dept: HOSPITAL 74 - YASAS | Age: 50
LOS: 4 days | Discharge: HOME | End: 2025-03-14
Attending: ALLERGY & IMMUNOLOGY | Admitting: ALLERGY & IMMUNOLOGY
Payer: COMMERCIAL

## 2025-03-10 VITALS — BODY MASS INDEX: 44.1 KG/M2

## 2025-03-10 DIAGNOSIS — E78.5: ICD-10-CM

## 2025-03-10 DIAGNOSIS — F10.230: Primary | ICD-10-CM

## 2025-03-10 DIAGNOSIS — G89.29: ICD-10-CM

## 2025-03-10 DIAGNOSIS — Z79.84: ICD-10-CM

## 2025-03-10 DIAGNOSIS — I25.10: ICD-10-CM

## 2025-03-10 DIAGNOSIS — F31.81: ICD-10-CM

## 2025-03-10 DIAGNOSIS — F17.210: ICD-10-CM

## 2025-03-10 DIAGNOSIS — E11.9: ICD-10-CM

## 2025-03-10 DIAGNOSIS — F41.9: ICD-10-CM

## 2025-03-10 DIAGNOSIS — Z95.5: ICD-10-CM

## 2025-03-10 DIAGNOSIS — M54.50: ICD-10-CM

## 2025-03-10 DIAGNOSIS — F14.20: ICD-10-CM

## 2025-03-10 DIAGNOSIS — F12.20: ICD-10-CM

## 2025-03-10 PROCEDURE — HZ2ZZZZ DETOXIFICATION SERVICES FOR SUBSTANCE ABUSE TREATMENT: ICD-10-PCS | Performed by: ALLERGY & IMMUNOLOGY

## 2025-03-10 RX ADMIN — Medication SCH TAB: at 16:59

## 2025-03-10 RX ADMIN — Medication SCH MG: at 23:26

## 2025-03-10 RX ADMIN — NICOTINE SCH MG: 14 PATCH, EXTENDED RELEASE TRANSDERMAL at 16:58

## 2025-03-10 RX ADMIN — TUBERCULIN PURIFIED PROTEIN DERIVATIVE ONE: 5 INJECTION, SOLUTION INTRADERMAL at 19:13

## 2025-03-10 RX ADMIN — Medication SCH MG: at 23:19

## 2025-03-10 RX ADMIN — GABAPENTIN SCH MG: 300 CAPSULE ORAL at 23:18

## 2025-03-10 RX ADMIN — ACETAMINOPHEN SCH MG: 500 TABLET, FILM COATED ORAL at 16:59

## 2025-03-11 LAB
ALBUMIN SERPL-MCNC: 3.1 G/DL (ref 3.4–5)
ALP SERPL-CCNC: 91 U/L (ref 45–117)
ALT SERPL-CCNC: 13 U/L (ref 13–61)
ANION GAP SERPL CALC-SCNC: 7 MMOL/L (ref 4–13)
AST SERPL-CCNC: 16 U/L (ref 15–37)
BILIRUB SERPL-MCNC: 0.4 MG/DL (ref 0.2–1)
BUN SERPL-MCNC: 17.1 MG/DL (ref 7–18)
CALCIUM SERPL-MCNC: 8.9 MG/DL (ref 8.5–10.1)
CHLORIDE SERPL-SCNC: 98 MMOL/L (ref 98–107)
CO2 SERPL-SCNC: 30 MMOL/L (ref 21–32)
CREAT SERPL-MCNC: 0.8 MG/DL (ref 0.55–1.3)
DEPRECATED RDW RBC AUTO: 15.3 % (ref 11.9–15.9)
GLUCOSE SERPL-MCNC: 262 MG/DL (ref 74–106)
HCT VFR BLD CALC: 41.4 % (ref 35.4–49)
HGB BLD-MCNC: 13.5 GM/DL (ref 11.7–16.9)
MCH RBC QN AUTO: 29.1 PG (ref 25.7–33.7)
MCHC RBC AUTO-ENTMCNC: 32.7 G/DL (ref 32–35.9)
MCV RBC: 89 FL (ref 80–96)
PLATELET # BLD AUTO: 191 10^3/UL (ref 134–434)
PMV BLD: 8.2 FL (ref 7.5–11.1)
POTASSIUM SERPLBLD-SCNC: 3.8 MMOL/L (ref 3.5–5.1)
PROT SERPL-MCNC: 6.6 G/DL (ref 6.4–8.2)
RBC # BLD AUTO: 4.65 M/MM3 (ref 4–5.6)
SODIUM SERPL-SCNC: 136 MMOL/L (ref 136–145)
WBC # BLD AUTO: 6.4 K/MM3 (ref 4–10)

## 2025-03-11 RX ADMIN — CLOPIDOGREL BISULFATE SCH MG: 75 TABLET, FILM COATED ORAL at 10:40

## 2025-03-11 RX ADMIN — AMLODIPINE BESYLATE SCH MG: 5 TABLET ORAL at 10:40

## 2025-03-11 RX ADMIN — ATORVASTATIN CALCIUM SCH: 40 TABLET, FILM COATED ORAL at 23:00

## 2025-03-11 RX ADMIN — LISINOPRIL SCH MG: 20 TABLET ORAL at 10:40

## 2025-03-11 RX ADMIN — ACAMPROSATE CALCIUM ENTERIC-COATED SCH MG: 333 TABLET, DELAYED RELEASE ORAL at 14:14

## 2025-03-11 RX ADMIN — ASPIRIN 81 MG SCH MG: 81 TABLET ORAL at 10:40

## 2025-03-11 RX ADMIN — PANTOPRAZOLE SODIUM SCH MG: 20 TABLET, DELAYED RELEASE ORAL at 10:40

## 2025-03-13 RX ADMIN — BISMUTH SUBSALICYLATE PRN ML: 262 LIQUID ORAL at 22:46

## 2025-03-14 VITALS — RESPIRATION RATE: 16 BRPM | SYSTOLIC BLOOD PRESSURE: 119 MMHG | HEART RATE: 65 BPM | DIASTOLIC BLOOD PRESSURE: 67 MMHG

## 2025-03-14 VITALS — TEMPERATURE: 97.6 F
